# Patient Record
Sex: MALE | Race: WHITE | NOT HISPANIC OR LATINO | URBAN - NONMETROPOLITAN AREA
[De-identification: names, ages, dates, MRNs, and addresses within clinical notes are randomized per-mention and may not be internally consistent; named-entity substitution may affect disease eponyms.]

---

## 2023-06-28 ENCOUNTER — HOSPITAL ENCOUNTER (EMERGENCY)
Facility: HOSPITAL | Age: 38
Discharge: HOME | End: 2023-06-28
Attending: FAMILY MEDICINE

## 2023-06-28 ENCOUNTER — HOSPITAL ENCOUNTER (OUTPATIENT)
Facility: HOSPITAL | Age: 38
Discharge: HOME | End: 2023-06-30
Admitting: FAMILY MEDICINE

## 2023-06-28 VITALS
DIASTOLIC BLOOD PRESSURE: 97 MMHG | HEART RATE: 74 BPM | TEMPERATURE: 99 F | OXYGEN SATURATION: 98 % | HEIGHT: 66 IN | RESPIRATION RATE: 18 BRPM | SYSTOLIC BLOOD PRESSURE: 139 MMHG | BODY MASS INDEX: 24.11 KG/M2 | WEIGHT: 150 LBS

## 2023-06-28 DIAGNOSIS — R10.11 RIGHT UPPER QUADRANT ABDOMINAL PAIN: Primary | ICD-10-CM

## 2023-06-28 DIAGNOSIS — D73.4 SPLENIC CYST: ICD-10-CM

## 2023-06-28 DIAGNOSIS — K35.80 ACUTE APPENDICITIS, UNSPECIFIED ACUTE APPENDICITIS TYPE: ICD-10-CM

## 2023-06-28 DIAGNOSIS — K35.30 ACUTE APPENDICITIS WITH LOCALIZED PERITONITIS, WITHOUT PERFORATION, ABSCESS, OR GANGRENE: Primary | ICD-10-CM

## 2023-06-28 LAB
ALBUMIN SERPL-MCNC: 4.6 G/DL (ref 3.4–5)
ALBUMIN SERPL-MCNC: 4.7 G/DL (ref 3.4–5)
ALBUMIN/GLOB SERPL: 1.6 RATIO
ALBUMIN/GLOB SERPL: 1.6 RATIO
ALP SERPL-CCNC: 73 UNIT/L (ref 50–144)
ALP SERPL-CCNC: 81 UNIT/L (ref 50–144)
ALT SERPL-CCNC: 33 UNIT/L (ref 1–45)
ALT SERPL-CCNC: 34 UNIT/L (ref 1–45)
ANION GAP SERPL CALC-SCNC: 7 MEQ/L (ref 2–13)
ANION GAP SERPL CALC-SCNC: 8 MEQ/L (ref 2–13)
APPEARANCE UR: CLEAR
APPEARANCE UR: CLEAR
AST SERPL-CCNC: 27 UNIT/L (ref 17–59)
AST SERPL-CCNC: 29 UNIT/L (ref 17–59)
BACTERIA #/AREA URNS AUTO: ABNORMAL /HPF
BASOPHILS # BLD AUTO: 0.03 X10(3)/MCL (ref 0.01–0.08)
BASOPHILS # BLD AUTO: 0.04 X10(3)/MCL (ref 0.01–0.08)
BASOPHILS NFR BLD AUTO: 0.2 % (ref 0.1–1.2)
BASOPHILS NFR BLD AUTO: 0.3 % (ref 0.1–1.2)
BILIRUB UR QL STRIP.AUTO: NEGATIVE MG/DL
BILIRUB UR QL STRIP.AUTO: NEGATIVE MG/DL
BILIRUBIN DIRECT+TOT PNL SERPL-MCNC: 0.5 MG/DL (ref 0–1)
BILIRUBIN DIRECT+TOT PNL SERPL-MCNC: 0.7 MG/DL (ref 0–1)
BUN SERPL-MCNC: 10 MG/DL (ref 7–20)
BUN SERPL-MCNC: 11 MG/DL (ref 7–20)
CALCIUM SERPL-MCNC: 9 MG/DL (ref 8.4–10.2)
CALCIUM SERPL-MCNC: 9.2 MG/DL (ref 8.4–10.2)
CHLORIDE SERPL-SCNC: 104 MMOL/L (ref 98–110)
CHLORIDE SERPL-SCNC: 106 MMOL/L (ref 98–110)
CO2 SERPL-SCNC: 22 MMOL/L (ref 21–32)
CO2 SERPL-SCNC: 28 MMOL/L (ref 21–32)
COLOR UR: YELLOW
COLOR UR: YELLOW
CREAT SERPL-MCNC: 0.67 MG/DL (ref 0.66–1.25)
CREAT SERPL-MCNC: 0.77 MG/DL (ref 0.66–1.25)
CREAT/UREA NIT SERPL: 14 (ref 12–20)
CREAT/UREA NIT SERPL: 15 (ref 12–20)
EOSINOPHIL # BLD AUTO: 0.02 X10(3)/MCL (ref 0.04–0.54)
EOSINOPHIL # BLD AUTO: 0.02 X10(3)/MCL (ref 0.04–0.54)
EOSINOPHIL NFR BLD AUTO: 0.1 % (ref 0.7–7)
EOSINOPHIL NFR BLD AUTO: 0.1 % (ref 0.7–7)
ERYTHROCYTE [DISTWIDTH] IN BLOOD BY AUTOMATED COUNT: 12.7 %
ERYTHROCYTE [DISTWIDTH] IN BLOOD BY AUTOMATED COUNT: 12.8 %
GFR SERPLBLD CREATININE-BSD FMLA CKD-EPI: >90 MLS/MIN/1.73/M2
GFR SERPLBLD CREATININE-BSD FMLA CKD-EPI: >90 MLS/MIN/1.73/M2
GLOBULIN SER-MCNC: 2.9 GM/DL (ref 2–3.9)
GLOBULIN SER-MCNC: 2.9 GM/DL (ref 2–3.9)
GLUCOSE SERPL-MCNC: 103 MG/DL (ref 70–115)
GLUCOSE SERPL-MCNC: 124 MG/DL (ref 70–115)
GLUCOSE UR QL STRIP.AUTO: NEGATIVE MG/DL
GLUCOSE UR QL STRIP.AUTO: NEGATIVE MG/DL
HCT VFR BLD AUTO: 47.9 % (ref 36–52)
HCT VFR BLD AUTO: 49.6 % (ref 36–52)
HGB BLD-MCNC: 16.7 G/DL (ref 13–18)
HGB BLD-MCNC: 17.1 G/DL (ref 13–18)
IMM GRANULOCYTES # BLD AUTO: 0.03 X10(3)/MCL (ref 0–0.03)
IMM GRANULOCYTES # BLD AUTO: 0.05 X10(3)/MCL (ref 0–0.03)
IMM GRANULOCYTES NFR BLD AUTO: 0.2 % (ref 0–0.5)
IMM GRANULOCYTES NFR BLD AUTO: 0.3 % (ref 0–0.5)
KETONES UR QL STRIP.AUTO: NEGATIVE MG/DL
KETONES UR QL STRIP.AUTO: NEGATIVE MG/DL
LEUKOCYTE ESTERASE UR QL STRIP.AUTO: NEGATIVE UNIT/L
LEUKOCYTE ESTERASE UR QL STRIP.AUTO: NEGATIVE UNIT/L
LIPASE SERPL-CCNC: 31 U/L (ref 23–300)
LYMPHOCYTES # BLD AUTO: 1.53 X10(3)/MCL (ref 1.32–3.57)
LYMPHOCYTES # BLD AUTO: 1.76 X10(3)/MCL (ref 1.32–3.57)
LYMPHOCYTES NFR BLD AUTO: 10.5 % (ref 20–55)
LYMPHOCYTES NFR BLD AUTO: 12.8 % (ref 20–55)
MCH RBC QN AUTO: 30.2 PG (ref 27–34)
MCH RBC QN AUTO: 30.3 PG (ref 27–34)
MCHC RBC AUTO-ENTMCNC: 34.5 G/DL (ref 31–37)
MCHC RBC AUTO-ENTMCNC: 34.9 G/DL (ref 31–37)
MCV RBC AUTO: 86.8 FL (ref 79–99)
MCV RBC AUTO: 87.5 FL (ref 79–99)
MONOCYTES # BLD AUTO: 0.72 X10(3)/MCL (ref 0.3–0.82)
MONOCYTES # BLD AUTO: 1.11 X10(3)/MCL (ref 0.3–0.82)
MONOCYTES NFR BLD AUTO: 5.2 % (ref 4.7–12.5)
MONOCYTES NFR BLD AUTO: 7.6 % (ref 4.7–12.5)
NEUTROPHILS # BLD AUTO: 11.16 X10(3)/MCL (ref 1.78–5.38)
NEUTROPHILS # BLD AUTO: 11.84 X10(3)/MCL (ref 1.78–5.38)
NEUTROPHILS NFR BLD AUTO: 81.3 % (ref 37–73)
NEUTROPHILS NFR BLD AUTO: 81.4 % (ref 37–73)
NITRITE UR QL STRIP.AUTO: NEGATIVE
NITRITE UR QL STRIP.AUTO: NEGATIVE
NRBC BLD AUTO-RTO: 0 %
NRBC BLD AUTO-RTO: 0 %
PH UR STRIP.AUTO: 6.5 [PH]
PH UR STRIP.AUTO: 7 [PH]
PLATELET # BLD AUTO: 231 X10(3)/MCL (ref 140–371)
PLATELET # BLD AUTO: 236 X10(3)/MCL (ref 140–371)
PMV BLD AUTO: 9.1 FL (ref 9.4–12.4)
PMV BLD AUTO: 9.2 FL (ref 9.4–12.4)
POTASSIUM SERPL-SCNC: 3.6 MMOL/L (ref 3.5–5.1)
POTASSIUM SERPL-SCNC: 4.2 MMOL/L (ref 3.5–5.1)
PROT SERPL-MCNC: 7.5 GM/DL (ref 6.3–8.2)
PROT SERPL-MCNC: 7.6 GM/DL (ref 6.3–8.2)
PROT UR QL STRIP.AUTO: NEGATIVE MG/DL
PROT UR QL STRIP.AUTO: NEGATIVE MG/DL
RBC # BLD AUTO: 5.52 X10(6)/MCL (ref 4–6)
RBC # BLD AUTO: 5.67 X10(6)/MCL (ref 4–6)
RBC #/AREA URNS AUTO: ABNORMAL /HPF
RBC UR QL AUTO: ABNORMAL UNIT/L
RBC UR QL AUTO: NEGATIVE UNIT/L
SODIUM SERPL-SCNC: 136 MMOL/L (ref 135–145)
SODIUM SERPL-SCNC: 139 MMOL/L (ref 135–145)
SP GR UR STRIP.AUTO: 1.02
SP GR UR STRIP.AUTO: <=1.005
SQUAMOUS #/AREA URNS AUTO: ABNORMAL /HPF
UROBILINOGEN UR STRIP-ACNC: 0.2 MG/DL
UROBILINOGEN UR STRIP-ACNC: 0.2 MG/DL
WBC # SPEC AUTO: 13.73 X10(3)/MCL (ref 4–11.5)
WBC # SPEC AUTO: 14.58 X10(3)/MCL (ref 4–11.5)
WBC #/AREA URNS AUTO: ABNORMAL /HPF

## 2023-06-28 PROCEDURE — 25000003 PHARM REV CODE 250

## 2023-06-28 PROCEDURE — 99285 EMERGENCY DEPT VISIT HI MDM: CPT | Mod: 25

## 2023-06-28 PROCEDURE — S0030 INJECTION, METRONIDAZOLE: HCPCS

## 2023-06-28 PROCEDURE — 85025 COMPLETE CBC W/AUTO DIFF WBC: CPT

## 2023-06-28 PROCEDURE — 63600175 PHARM REV CODE 636 W HCPCS

## 2023-06-28 PROCEDURE — G0378 HOSPITAL OBSERVATION PER HR: HCPCS

## 2023-06-28 PROCEDURE — 85025 COMPLETE CBC W/AUTO DIFF WBC: CPT | Performed by: FAMILY MEDICINE

## 2023-06-28 PROCEDURE — 96375 TX/PRO/DX INJ NEW DRUG ADDON: CPT

## 2023-06-28 PROCEDURE — 96372 THER/PROPH/DIAG INJ SC/IM: CPT | Mod: 59 | Performed by: FAMILY MEDICINE

## 2023-06-28 PROCEDURE — 96367 TX/PROPH/DG ADDL SEQ IV INF: CPT

## 2023-06-28 PROCEDURE — 63600175 PHARM REV CODE 636 W HCPCS: Performed by: FAMILY MEDICINE

## 2023-06-28 PROCEDURE — 99285 EMERGENCY DEPT VISIT HI MDM: CPT | Mod: 25,27

## 2023-06-28 PROCEDURE — 36415 COLL VENOUS BLD VENIPUNCTURE: CPT

## 2023-06-28 PROCEDURE — 81001 URINALYSIS AUTO W/SCOPE: CPT | Performed by: FAMILY MEDICINE

## 2023-06-28 PROCEDURE — 80053 COMPREHEN METABOLIC PANEL: CPT

## 2023-06-28 PROCEDURE — 25500020 PHARM REV CODE 255

## 2023-06-28 PROCEDURE — 80053 COMPREHEN METABOLIC PANEL: CPT | Performed by: FAMILY MEDICINE

## 2023-06-28 PROCEDURE — 83690 ASSAY OF LIPASE: CPT

## 2023-06-28 PROCEDURE — 96365 THER/PROPH/DIAG IV INF INIT: CPT

## 2023-06-28 PROCEDURE — 36415 COLL VENOUS BLD VENIPUNCTURE: CPT | Performed by: FAMILY MEDICINE

## 2023-06-28 PROCEDURE — 81003 URINALYSIS AUTO W/O SCOPE: CPT

## 2023-06-28 RX ORDER — ONDANSETRON 2 MG/ML
4 INJECTION INTRAMUSCULAR; INTRAVENOUS EVERY 8 HOURS PRN
Status: DISCONTINUED | OUTPATIENT
Start: 2023-06-28 | End: 2023-06-28

## 2023-06-28 RX ORDER — SODIUM CHLORIDE 0.9 % (FLUSH) 0.9 %
10 SYRINGE (ML) INJECTION
Status: DISCONTINUED | OUTPATIENT
Start: 2023-06-28 | End: 2023-06-30 | Stop reason: HOSPADM

## 2023-06-28 RX ORDER — SODIUM CHLORIDE 450 MG/100ML
INJECTION, SOLUTION INTRAVENOUS CONTINUOUS
Status: DISCONTINUED | OUTPATIENT
Start: 2023-06-29 | End: 2023-06-30 | Stop reason: HOSPADM

## 2023-06-28 RX ORDER — LORAZEPAM 2 MG/ML
0.5 INJECTION INTRAMUSCULAR
Status: COMPLETED | OUTPATIENT
Start: 2023-06-28 | End: 2023-06-28

## 2023-06-28 RX ORDER — SUCRALFATE 1 G/10ML
1 SUSPENSION ORAL EVERY 6 HOURS
Status: DISCONTINUED | OUTPATIENT
Start: 2023-06-28 | End: 2023-06-30 | Stop reason: HOSPADM

## 2023-06-28 RX ORDER — FAMOTIDINE 10 MG/ML
20 INJECTION INTRAVENOUS EVERY 12 HOURS
Status: DISCONTINUED | OUTPATIENT
Start: 2023-06-29 | End: 2023-06-30 | Stop reason: HOSPADM

## 2023-06-28 RX ORDER — HYDRALAZINE HYDROCHLORIDE 20 MG/ML
10 INJECTION INTRAMUSCULAR; INTRAVENOUS EVERY 6 HOURS PRN
Status: DISCONTINUED | OUTPATIENT
Start: 2023-06-29 | End: 2023-06-30 | Stop reason: HOSPADM

## 2023-06-28 RX ORDER — TALC
6 POWDER (GRAM) TOPICAL NIGHTLY PRN
Status: DISCONTINUED | OUTPATIENT
Start: 2023-06-28 | End: 2023-06-30 | Stop reason: HOSPADM

## 2023-06-28 RX ORDER — HYDROMORPHONE HYDROCHLORIDE 1 MG/ML
0.5 INJECTION, SOLUTION INTRAMUSCULAR; INTRAVENOUS; SUBCUTANEOUS
Status: COMPLETED | OUTPATIENT
Start: 2023-06-28 | End: 2023-06-28

## 2023-06-28 RX ORDER — HYDROMORPHONE HYDROCHLORIDE 1 MG/ML
1 INJECTION, SOLUTION INTRAMUSCULAR; INTRAVENOUS; SUBCUTANEOUS EVERY 4 HOURS PRN
Status: DISCONTINUED | OUTPATIENT
Start: 2023-06-28 | End: 2023-06-30 | Stop reason: HOSPADM

## 2023-06-28 RX ORDER — PANTOPRAZOLE SODIUM 40 MG/10ML
40 INJECTION, POWDER, LYOPHILIZED, FOR SOLUTION INTRAVENOUS DAILY
Status: DISCONTINUED | OUTPATIENT
Start: 2023-06-29 | End: 2023-06-30 | Stop reason: HOSPADM

## 2023-06-28 RX ORDER — METRONIDAZOLE 500 MG/100ML
500 INJECTION, SOLUTION INTRAVENOUS
Status: DISCONTINUED | OUTPATIENT
Start: 2023-06-28 | End: 2023-06-30 | Stop reason: HOSPADM

## 2023-06-28 RX ORDER — DEXTROSE MONOHYDRATE AND SODIUM CHLORIDE 5; .9 G/100ML; G/100ML
INJECTION, SOLUTION INTRAVENOUS CONTINUOUS
Status: DISCONTINUED | OUTPATIENT
Start: 2023-06-29 | End: 2023-06-28

## 2023-06-28 RX ORDER — ONDANSETRON 2 MG/ML
4 INJECTION INTRAMUSCULAR; INTRAVENOUS EVERY 6 HOURS PRN
Status: DISCONTINUED | OUTPATIENT
Start: 2023-06-29 | End: 2023-06-30 | Stop reason: HOSPADM

## 2023-06-28 RX ORDER — HYDROMORPHONE HYDROCHLORIDE 1 MG/ML
0.5 INJECTION, SOLUTION INTRAMUSCULAR; INTRAVENOUS; SUBCUTANEOUS EVERY 4 HOURS PRN
Status: DISCONTINUED | OUTPATIENT
Start: 2023-06-28 | End: 2023-06-28

## 2023-06-28 RX ORDER — DICYCLOMINE HYDROCHLORIDE 20 MG/1
20 TABLET ORAL
Qty: 15 TABLET | Refills: 0 | Status: SHIPPED | OUTPATIENT
Start: 2023-06-28 | End: 2023-07-26 | Stop reason: ALTCHOICE

## 2023-06-28 RX ORDER — PROCHLORPERAZINE EDISYLATE 5 MG/ML
5 INJECTION INTRAMUSCULAR; INTRAVENOUS EVERY 6 HOURS PRN
Status: DISCONTINUED | OUTPATIENT
Start: 2023-06-28 | End: 2023-06-30 | Stop reason: HOSPADM

## 2023-06-28 RX ORDER — KETOROLAC TROMETHAMINE 30 MG/ML
30 INJECTION, SOLUTION INTRAMUSCULAR; INTRAVENOUS
Status: DISCONTINUED | OUTPATIENT
Start: 2023-06-28 | End: 2023-06-28

## 2023-06-28 RX ORDER — KETOROLAC TROMETHAMINE 30 MG/ML
60 INJECTION, SOLUTION INTRAMUSCULAR; INTRAVENOUS
Status: COMPLETED | OUTPATIENT
Start: 2023-06-28 | End: 2023-06-28

## 2023-06-28 RX ORDER — METRONIDAZOLE 500 MG/100ML
500 INJECTION, SOLUTION INTRAVENOUS
Status: DISCONTINUED | OUTPATIENT
Start: 2023-06-28 | End: 2023-06-28

## 2023-06-28 RX ORDER — MORPHINE SULFATE 2 MG/ML
2 INJECTION, SOLUTION INTRAMUSCULAR; INTRAVENOUS EVERY 6 HOURS PRN
Status: DISCONTINUED | OUTPATIENT
Start: 2023-06-29 | End: 2023-06-30 | Stop reason: HOSPADM

## 2023-06-28 RX ADMIN — HYDROMORPHONE HYDROCHLORIDE 0.5 MG: 1 INJECTION, SOLUTION INTRAMUSCULAR; INTRAVENOUS; SUBCUTANEOUS at 09:06

## 2023-06-28 RX ADMIN — IOPAMIDOL 100 ML: 755 INJECTION, SOLUTION INTRAVENOUS at 09:06

## 2023-06-28 RX ADMIN — KETOROLAC TROMETHAMINE 60 MG: 30 INJECTION, SOLUTION INTRAMUSCULAR; INTRAVENOUS at 12:06

## 2023-06-28 RX ADMIN — METRONIDAZOLE 500 MG: 500 INJECTION, SOLUTION INTRAVENOUS at 11:06

## 2023-06-28 RX ADMIN — LORAZEPAM 0.5 MG: 2 INJECTION INTRAMUSCULAR; INTRAVENOUS at 09:06

## 2023-06-28 RX ADMIN — SODIUM CHLORIDE 1000 ML: 9 INJECTION, SOLUTION INTRAVENOUS at 09:06

## 2023-06-28 RX ADMIN — PIPERACILLIN AND TAZOBACTAM 4.5 G: 4; .5 INJECTION, POWDER, FOR SOLUTION INTRAVENOUS; PARENTERAL at 09:06

## 2023-06-28 NOTE — ED NOTES
Pt to Ed with C/o RUQ abd pain, pt tender on palpation, last BM, yesterday, reported normal. Denies fevers. Pt aaox4, gcs 15

## 2023-06-28 NOTE — DISCHARGE INSTRUCTIONS
To good idea to follow up with a surgeon to re-evaluate the cyst.  Dr. Mclean's number is provided.  If you develop severe pain weakness high fevers etc. feel free to return to the emergency room for further evaluation.

## 2023-06-28 NOTE — ED PROVIDER NOTES
Encounter Date: 6/28/2023       History     Chief Complaint   Patient presents with    Abdominal Pain     Pt to Ed with c/o URQ abd pain since this am. Worse with movement. Denies fever, Last normal BM yesterday. Last Oral intake 0730a.      Patient presents to the emergency room with a history of right upper quadrant pain that started this morning.  He describes it as pretty severe, no nausea or vomiting, no fever.  It was never happened before it was not worse with movement or eating.    The history is provided by the patient.   Review of patient's allergies indicates:  No Known Allergies  History reviewed. No pertinent past medical history.  History reviewed. No pertinent surgical history.  History reviewed. No pertinent family history.  Social History     Tobacco Use    Smoking status: Every Day     Packs/day: 1.00     Years: 18.00     Pack years: 18.00     Types: Cigarettes    Smokeless tobacco: Never   Substance Use Topics    Drug use: Not Currently     Types: Marijuana     Review of Systems   Constitutional:  Negative for fever.   HENT:  Negative for sore throat.    Respiratory:  Negative for shortness of breath.    Cardiovascular:  Negative for chest pain.   Gastrointestinal:  Positive for abdominal pain. Negative for constipation, diarrhea, nausea and vomiting.   Genitourinary:  Negative for dysuria.   Musculoskeletal:  Negative for back pain.   Skin:  Negative for rash.   Neurological:  Negative for weakness.   Hematological:  Does not bruise/bleed easily.   All other systems reviewed and are negative.    Physical Exam     Initial Vitals [06/28/23 1143]   BP Pulse Resp Temp SpO2   (!) 163/101 74 18 98.9 °F (37.2 °C) 98 %      MAP       --         Physical Exam    Nursing note and vitals reviewed.  Constitutional: Vital signs are normal. He appears well-developed and well-nourished. He is cooperative.  Non-toxic appearance. He does not appear ill.   Appears uncomfortable, but is in no distress. A+Ox4.     HENT:   Head: Normocephalic and atraumatic.   Eyes: Conjunctivae and lids are normal.   Neck: Trachea normal. Neck supple.   Cardiovascular:  Normal rate and regular rhythm.  No extrasystoles are present.          Pulmonary/Chest: Breath sounds normal.   Abdominal: Abdomen is soft. There is abdominal tenderness.   Moderate point tenderness right middle quadrant. No r/g, nabs   Musculoskeletal:         General: Normal range of motion.      Cervical back: Neck supple.     Neurological: He is alert and oriented to person, place, and time. He has normal strength. No cranial nerve deficit or sensory deficit. He displays a negative Romberg sign.   Skin: Skin is warm, dry and intact. Capillary refill takes less than 2 seconds.   Psychiatric: He has a normal mood and affect. His speech is normal and behavior is normal. He is not actively hallucinating. He is attentive.       ED Course   Procedures  Labs Reviewed   CBC WITH DIFFERENTIAL - Abnormal; Notable for the following components:       Result Value    WBC 13.73 (*)     MPV 9.1 (*)     Neut % 81.4 (*)     Lymph % 12.8 (*)     Eos % 0.1 (*)     Neut # 11.16 (*)     Eos # 0.02 (*)     All other components within normal limits   CBC W/ AUTO DIFFERENTIAL    Narrative:     The following orders were created for panel order CBC Auto Differential.  Procedure                               Abnormality         Status                     ---------                               -----------         ------                     CBC with Differential[[id number]]        Abnormal            Final result                 Please view results for these tests on the individual orders.   COMPREHENSIVE METABOLIC PANEL   URINALYSIS    Narrative:      URINE STABILITY IS 2 HOURS AT ROOM TEMP OR    SIX HOURS REFRIGERATED. PERFORMING TESTING ON    SPECIMENS GREATER THAN THIS AGE MAY AFFECT THE    FOLLOWING TESTS:    PH          SPECIFIC GRAVITY           BLOOD    CLARITY     BILIRUBIN                UROBILINOGEN          Imaging Results              CT Abdomen Pelvis  Without Contrast (Final result)  Result time 06/28/23 12:56:19      Final result by Yg Bowen III, MD (06/28/23 12:56:19)                   Impression:      1. A large (at least 10-11 cm in greatest diameter) cyst with thin peripheral calcification is noted within the spleen.  As described above.  See above comments.  2. Low-grade chronic changes are present      Electronically signed by: Yg Bowen  Date:    06/28/2023  Time:    12:56               Narrative:    EXAMINATION:  CT ABDOMEN PELVIS WITHOUT CONTRAST    CLINICAL HISTORY AND TECHNIQUE:  Aylin Curry, RT on 6/28/2023 12:37 PM    PT STATUS: ER    PROCEDURE: CT ABD/PELVIS WO    CLINICAL HX : RLQ ABD. PAIN ONSET THIS AM    PMH: N/A    IV CONTRAST: NONE    ORAL CONTRAST: NONE    RECTAL CONTRAST: NONE    AXIAL IMAGES @ 5MM INTERVALS WITH MULTIPLANAR RECONSTRUCTION    TOTAL IMAGE NUMBER: 158    NUMBER OF CT SCANS IN PAST 12 MONTHS: 1    CTDIvol(mGy): HEAD:     BODY: 5.3    DLP(mGycm): HEAD:     BODY: 309.2    TECH: AKG/KDF    PT TRANSPORTED W/O INCIDENT    This patient has had 1 CT and nuclear medicine scans performed within the last 12 months.    The following DOSE REDUCTION TECHNIQUES are used for all CT scans at [facility]:    1. Automated exposure control.  2. Adjustment of the mA and/or kv according to patient size.  3. Use of iterative reconstruction technique.    COMPARISON:  None    FINDINGS:  Liver: No clinically significant abnormalities are noted.    Gallbladder/biliary system: No clinically significant abnormalities are noted.    Spleen: A large (at least 10-11 cm in greatest diameter) cyst with thin peripheral calcification is noted within the spleen.    Adrenal glands: No clinically significant abnormalities are noted.    Pancreas: No clinically significant abnormalities are noted.    Kidneys/ureters: No clinically significant abnormalities are  noted.    Urinary bladder: The urinary bladder is poorly distended with no gross abnormalities noted on limited imaging.    Prostate gland and seminal vesicles: No clinically significant abnormalities are noted.    GI tract: Unopacified loops of large and small bowel as well as the gastric lumen and appendix are difficult to evaluate with no definite abnormalities appreciated.    Vascular structures: Minimal atherosclerotic plaquing is noted within the abdominal aorta.    Musculoskeletal structures: A mild-to-moderate, levoconvex, scoliotic curvature of the lumbar spine is present with minimal degenerative changes noted involving the lumbar spine.    Miscellaneous: N/A                                    X-Rays:   Independently Interpreted Readings:   Abdomen: Large splenic cyst 10cm   Medications   ketorolac injection 60 mg (60 mg Intramuscular Given 6/28/23 1227)     Medical Decision Making:   Initial Assessment:   Abdominal pain  Differential Diagnosis:   UTI, kidney stone, GB, appendix  Clinical Tests:   Lab Tests: Ordered and Reviewed  The following lab test(s) were unremarkable: CBC, CMP and Urinalysis  Radiological Study: Ordered and Reviewed  ED Management:  All labs are normal, CT shows no abnormalities in the right side of the abdomen but did show a large about a 10 cm splenic cyst with calcifications.  Doubt that the cyst is causing pain being that it is on the opposite side of the abdomen.  We will have him follow-up with a surgeon, family with him wants to take him to see Dr. Mclean we will provide the phone number.                        Clinical Impression:   Final diagnoses:  [R10.11] Right upper quadrant abdominal pain (Primary)  [D73.4] Splenic cyst        ED Disposition Condition    Discharge Stable          ED Prescriptions       Medication Sig Dispense Start Date End Date Auth. Provider    dicyclomine (BENTYL) 20 mg tablet Take 1 tablet (20 mg total) by mouth every 6 to 8 hours as needed  (abdominal pain). 15 tablet 6/28/2023 -- Quinton Nix MD          Follow-up Information       Follow up With Specialties Details Why Contact Info    Matthew Mclean MD General Surgery, Cardiology Schedule an appointment as soon as possible for a visit   [id number]  [facility] [address]  [place] [id number]  [phone]               Quinton Nix MD  06/28/23 1903

## 2023-06-29 ENCOUNTER — ANESTHESIA EVENT (OUTPATIENT)
Dept: SURGERY | Facility: HOSPITAL | Age: 38
End: 2023-06-29

## 2023-06-29 ENCOUNTER — ANESTHESIA (OUTPATIENT)
Dept: SURGERY | Facility: HOSPITAL | Age: 38
End: 2023-06-29

## 2023-06-29 PROBLEM — K35.32 ACUTE APPENDICITIS WITH PERFORATION, LOCALIZED PERITONITIS, AND GANGRENE, WITHOUT ABSCESS: Status: ACTIVE | Noted: 2023-06-29

## 2023-06-29 LAB
ANION GAP SERPL CALC-SCNC: 6 MEQ/L (ref 2–13)
BASOPHILS # BLD AUTO: 0.03 X10(3)/MCL (ref 0.01–0.08)
BASOPHILS NFR BLD AUTO: 0.2 % (ref 0.1–1.2)
BUN SERPL-MCNC: 9 MG/DL (ref 7–20)
CALCIUM SERPL-MCNC: 8.7 MG/DL (ref 8.4–10.2)
CHLORIDE SERPL-SCNC: 106 MMOL/L (ref 98–110)
CO2 SERPL-SCNC: 26 MMOL/L (ref 21–32)
CREAT SERPL-MCNC: 0.72 MG/DL (ref 0.66–1.25)
CREAT/UREA NIT SERPL: 13 (ref 12–20)
EOSINOPHIL # BLD AUTO: 0.03 X10(3)/MCL (ref 0.04–0.54)
EOSINOPHIL NFR BLD AUTO: 0.2 % (ref 0.7–7)
ERYTHROCYTE [DISTWIDTH] IN BLOOD BY AUTOMATED COUNT: 13 %
GFR SERPLBLD CREATININE-BSD FMLA CKD-EPI: >90 MLS/MIN/1.73/M2
GLUCOSE SERPL-MCNC: 91 MG/DL (ref 70–115)
HCT VFR BLD AUTO: 46.1 % (ref 36–52)
HGB BLD-MCNC: 15.7 G/DL (ref 13–18)
IMM GRANULOCYTES # BLD AUTO: 0.05 X10(3)/MCL (ref 0–0.03)
IMM GRANULOCYTES NFR BLD AUTO: 0.4 % (ref 0–0.5)
LYMPHOCYTES # BLD AUTO: 2.53 X10(3)/MCL (ref 1.32–3.57)
LYMPHOCYTES NFR BLD AUTO: 17.9 % (ref 20–55)
MCH RBC QN AUTO: 30 PG (ref 27–34)
MCHC RBC AUTO-ENTMCNC: 34.1 G/DL (ref 31–37)
MCV RBC AUTO: 88 FL (ref 79–99)
MONOCYTES # BLD AUTO: 1.06 X10(3)/MCL (ref 0.3–0.82)
MONOCYTES NFR BLD AUTO: 7.5 % (ref 4.7–12.5)
NEUTROPHILS # BLD AUTO: 10.47 X10(3)/MCL (ref 1.78–5.38)
NEUTROPHILS NFR BLD AUTO: 73.8 % (ref 37–73)
NRBC BLD AUTO-RTO: 0 %
PLATELET # BLD AUTO: 211 X10(3)/MCL (ref 140–371)
PMV BLD AUTO: 9.5 FL (ref 9.4–12.4)
POTASSIUM SERPL-SCNC: 4.3 MMOL/L (ref 3.5–5.1)
RBC # BLD AUTO: 5.24 X10(6)/MCL (ref 4–6)
SODIUM SERPL-SCNC: 138 MMOL/L (ref 135–145)
WBC # SPEC AUTO: 14.17 X10(3)/MCL (ref 4–11.5)

## 2023-06-29 PROCEDURE — 63600175 PHARM REV CODE 636 W HCPCS

## 2023-06-29 PROCEDURE — 36000709 HC OR TIME LEV III EA ADD 15 MIN: Performed by: SURGERY

## 2023-06-29 PROCEDURE — 63600175 PHARM REV CODE 636 W HCPCS: Performed by: INTERNAL MEDICINE

## 2023-06-29 PROCEDURE — 63600175 PHARM REV CODE 636 W HCPCS: Performed by: SURGERY

## 2023-06-29 PROCEDURE — 25000003 PHARM REV CODE 250: Performed by: INTERNAL MEDICINE

## 2023-06-29 PROCEDURE — 63600175 PHARM REV CODE 636 W HCPCS: Performed by: NURSE ANESTHETIST, CERTIFIED REGISTERED

## 2023-06-29 PROCEDURE — D9220A PRA ANESTHESIA: Mod: ,,, | Performed by: NURSE ANESTHETIST, CERTIFIED REGISTERED

## 2023-06-29 PROCEDURE — 96366 THER/PROPH/DIAG IV INF ADDON: CPT

## 2023-06-29 PROCEDURE — 96368 THER/DIAG CONCURRENT INF: CPT

## 2023-06-29 PROCEDURE — 96375 TX/PRO/DX INJ NEW DRUG ADDON: CPT

## 2023-06-29 PROCEDURE — 25000003 PHARM REV CODE 250: Performed by: NURSE ANESTHETIST, CERTIFIED REGISTERED

## 2023-06-29 PROCEDURE — G0378 HOSPITAL OBSERVATION PER HR: HCPCS

## 2023-06-29 PROCEDURE — D9220A PRA ANESTHESIA: ICD-10-PCS | Mod: ,,, | Performed by: NURSE ANESTHETIST, CERTIFIED REGISTERED

## 2023-06-29 PROCEDURE — 25000003 PHARM REV CODE 250: Performed by: SURGERY

## 2023-06-29 PROCEDURE — 36415 COLL VENOUS BLD VENIPUNCTURE: CPT

## 2023-06-29 PROCEDURE — S0030 INJECTION, METRONIDAZOLE: HCPCS | Performed by: SURGERY

## 2023-06-29 PROCEDURE — S0030 INJECTION, METRONIDAZOLE: HCPCS

## 2023-06-29 PROCEDURE — C9113 INJ PANTOPRAZOLE SODIUM, VIA: HCPCS | Performed by: INTERNAL MEDICINE

## 2023-06-29 PROCEDURE — 80048 BASIC METABOLIC PNL TOTAL CA: CPT

## 2023-06-29 PROCEDURE — 96376 TX/PRO/DX INJ SAME DRUG ADON: CPT

## 2023-06-29 PROCEDURE — 36000708 HC OR TIME LEV III 1ST 15 MIN: Performed by: SURGERY

## 2023-06-29 PROCEDURE — 37000009 HC ANESTHESIA EA ADD 15 MINS: Performed by: SURGERY

## 2023-06-29 PROCEDURE — 37000008 HC ANESTHESIA 1ST 15 MINUTES: Performed by: SURGERY

## 2023-06-29 PROCEDURE — 71000033 HC RECOVERY, INTIAL HOUR: Performed by: SURGERY

## 2023-06-29 PROCEDURE — 96361 HYDRATE IV INFUSION ADD-ON: CPT

## 2023-06-29 PROCEDURE — 27201423 OPTIME MED/SURG SUP & DEVICES STERILE SUPPLY: Performed by: SURGERY

## 2023-06-29 PROCEDURE — 25000003 PHARM REV CODE 250

## 2023-06-29 PROCEDURE — 25000003 PHARM REV CODE 250: Performed by: FAMILY MEDICINE

## 2023-06-29 PROCEDURE — 94761 N-INVAS EAR/PLS OXIMETRY MLT: CPT

## 2023-06-29 PROCEDURE — 85025 COMPLETE CBC W/AUTO DIFF WBC: CPT

## 2023-06-29 RX ORDER — PROPOFOL 10 MG/ML
VIAL (ML) INTRAVENOUS
Status: DISCONTINUED | OUTPATIENT
Start: 2023-06-29 | End: 2023-06-29

## 2023-06-29 RX ORDER — HYDROCODONE BITARTRATE AND ACETAMINOPHEN 7.5; 325 MG/1; MG/1
1 TABLET ORAL EVERY 6 HOURS PRN
Status: DISCONTINUED | OUTPATIENT
Start: 2023-06-29 | End: 2023-06-30 | Stop reason: HOSPADM

## 2023-06-29 RX ORDER — MIDAZOLAM HYDROCHLORIDE 1 MG/ML
2 INJECTION INTRAMUSCULAR; INTRAVENOUS
Status: COMPLETED | OUTPATIENT
Start: 2023-06-29 | End: 2023-06-29

## 2023-06-29 RX ORDER — DEXMEDETOMIDINE HYDROCHLORIDE 100 UG/ML
INJECTION, SOLUTION INTRAVENOUS
Status: DISCONTINUED | OUTPATIENT
Start: 2023-06-29 | End: 2023-06-29

## 2023-06-29 RX ORDER — VECURONIUM BROMIDE FOR INJECTION 1 MG/ML
INJECTION, POWDER, LYOPHILIZED, FOR SOLUTION INTRAVENOUS
Status: DISCONTINUED | OUTPATIENT
Start: 2023-06-29 | End: 2023-06-29

## 2023-06-29 RX ORDER — LIDOCAINE HYDROCHLORIDE 20 MG/ML
INJECTION INTRAVENOUS
Status: DISCONTINUED | OUTPATIENT
Start: 2023-06-29 | End: 2023-06-29

## 2023-06-29 RX ORDER — ACETAMINOPHEN 10 MG/ML
INJECTION, SOLUTION INTRAVENOUS
Status: DISCONTINUED | OUTPATIENT
Start: 2023-06-29 | End: 2023-06-29

## 2023-06-29 RX ORDER — FENTANYL CITRATE 50 UG/ML
INJECTION, SOLUTION INTRAMUSCULAR; INTRAVENOUS
Status: DISCONTINUED | OUTPATIENT
Start: 2023-06-29 | End: 2023-06-29

## 2023-06-29 RX ORDER — FAMOTIDINE 20 MG/1
20 TABLET, FILM COATED ORAL
Status: COMPLETED | OUTPATIENT
Start: 2023-06-29 | End: 2023-06-29

## 2023-06-29 RX ORDER — LIDOCAINE HYDROCHLORIDE AND EPINEPHRINE 10; 10 MG/ML; UG/ML
INJECTION, SOLUTION INFILTRATION; PERINEURAL
Status: DISCONTINUED | OUTPATIENT
Start: 2023-06-29 | End: 2023-06-29 | Stop reason: HOSPADM

## 2023-06-29 RX ADMIN — FENTANYL CITRATE 50 MCG: 50 INJECTION, SOLUTION INTRAMUSCULAR; INTRAVENOUS at 11:06

## 2023-06-29 RX ADMIN — VECURONIUM BROMIDE 7 MG: 10 INJECTION, POWDER, FOR SOLUTION INTRAVENOUS at 11:06

## 2023-06-29 RX ADMIN — GLYCOPYRROLATE 0.2 MG: 0.2 INJECTION, SOLUTION INTRAMUSCULAR; INTRAVITREAL at 10:06

## 2023-06-29 RX ADMIN — LIDOCAINE HYDROCHLORIDE 60 MG: 20 INJECTION, SOLUTION INTRAVENOUS at 11:06

## 2023-06-29 RX ADMIN — FENTANYL CITRATE 50 MCG: 50 INJECTION, SOLUTION INTRAMUSCULAR; INTRAVENOUS at 12:06

## 2023-06-29 RX ADMIN — DEXMEDETOMIDINE 10 MCG: 100 INJECTION, SOLUTION, CONCENTRATE INTRAVENOUS at 12:06

## 2023-06-29 RX ADMIN — PIPERACILLIN AND TAZOBACTAM 4.5 G: 4; .5 INJECTION, POWDER, FOR SOLUTION INTRAVENOUS; PARENTERAL at 04:06

## 2023-06-29 RX ADMIN — FAMOTIDINE 20 MG: 10 INJECTION, SOLUTION INTRAVENOUS at 12:06

## 2023-06-29 RX ADMIN — FAMOTIDINE 20 MG: 10 INJECTION, SOLUTION INTRAVENOUS at 08:06

## 2023-06-29 RX ADMIN — HYDROCODONE BITARTRATE AND ACETAMINOPHEN 1 TABLET: 7.5; 325 TABLET ORAL at 03:06

## 2023-06-29 RX ADMIN — METRONIDAZOLE 500 MG: 500 INJECTION, SOLUTION INTRAVENOUS at 11:06

## 2023-06-29 RX ADMIN — HYDROMORPHONE HYDROCHLORIDE 1 MG: 1 INJECTION, SOLUTION INTRAMUSCULAR; INTRAVENOUS; SUBCUTANEOUS at 08:06

## 2023-06-29 RX ADMIN — MIDAZOLAM HYDROCHLORIDE 2 MG: 1 INJECTION, SOLUTION INTRAMUSCULAR; INTRAVENOUS at 10:06

## 2023-06-29 RX ADMIN — SUCRALFATE ORAL 1 G: 1 SUSPENSION ORAL at 11:06

## 2023-06-29 RX ADMIN — CEFOXITIN SODIUM 2 G: 2 POWDER, FOR SOLUTION INTRAVENOUS at 11:06

## 2023-06-29 RX ADMIN — SUGAMMADEX 200 MG: 100 INJECTION, SOLUTION INTRAVENOUS at 12:06

## 2023-06-29 RX ADMIN — SUCRALFATE ORAL 1 G: 1 SUSPENSION ORAL at 05:06

## 2023-06-29 RX ADMIN — FAMOTIDINE 20 MG: 20 TABLET, FILM COATED ORAL at 10:06

## 2023-06-29 RX ADMIN — PANTOPRAZOLE SODIUM 40 MG: 40 INJECTION, POWDER, FOR SOLUTION INTRAVENOUS at 08:06

## 2023-06-29 RX ADMIN — PIPERACILLIN AND TAZOBACTAM 4.5 G: 4; .5 INJECTION, POWDER, FOR SOLUTION INTRAVENOUS; PARENTERAL at 08:06

## 2023-06-29 RX ADMIN — METRONIDAZOLE 500 MG: 500 INJECTION, SOLUTION INTRAVENOUS at 03:06

## 2023-06-29 RX ADMIN — MORPHINE SULFATE 2 MG: 2 INJECTION, SOLUTION INTRAMUSCULAR; INTRAVENOUS at 05:06

## 2023-06-29 RX ADMIN — METRONIDAZOLE 500 MG: 500 INJECTION, SOLUTION INTRAVENOUS at 07:06

## 2023-06-29 RX ADMIN — DEXMEDETOMIDINE 10 MCG: 100 INJECTION, SOLUTION, CONCENTRATE INTRAVENOUS at 11:06

## 2023-06-29 RX ADMIN — SODIUM CHLORIDE: 4.5 INJECTION, SOLUTION INTRAVENOUS at 12:06

## 2023-06-29 RX ADMIN — PIPERACILLIN AND TAZOBACTAM 4.5 G: 4; .5 INJECTION, POWDER, FOR SOLUTION INTRAVENOUS; PARENTERAL at 12:06

## 2023-06-29 RX ADMIN — SUCRALFATE ORAL 1 G: 1 SUSPENSION ORAL at 12:06

## 2023-06-29 RX ADMIN — FENTANYL CITRATE 100 MCG: 50 INJECTION, SOLUTION INTRAMUSCULAR; INTRAVENOUS at 11:06

## 2023-06-29 RX ADMIN — ACETAMINOPHEN 1000 MG: 1000 INJECTION, SOLUTION INTRAVENOUS at 11:06

## 2023-06-29 RX ADMIN — PROPOFOL 200 MG: 10 INJECTION, EMULSION INTRAVENOUS at 11:06

## 2023-06-29 RX ADMIN — VECURONIUM BROMIDE 1 MG: 10 INJECTION, POWDER, FOR SOLUTION INTRAVENOUS at 11:06

## 2023-06-29 NOTE — PROGRESS NOTES
Hospital Medicine  Progress Note    Patient Name: Anthony Ibarra  MRN: [id number]  Status: OP- Observation   Admission Date: 6/28/2023  Length of Stay: 0  Date of Service: 06/29/2023       CC: hospital follow-up for        SUBJECTIVE   37 y.o. male with a medical history of nicotine addiction presented to the ER on account of persistent right lower quadrant abdominal pain since earlier this morning.    Patient has been at his usual state of health until earlier today when he developed right lower quadrant abdominal pain, sharp, persistent, about 8/10 in intensity, radiating to involve the lower abdominal area with no aggravating or relieving factor.  Associated with nausea but no vomiting.  No diarrhea no constipation.  No fever.  He decided to come to the ER for further evaluation.  Earlier in the morning he had a CT of the abdomen without contrast which does not demonstrate any significant finding.  However, he had another CT of the abdomen with contrast this evening and the results demonstrates multiple loops of nondilated fluid-filled small bowel and at the junction of the large and small bowel several intraluminal small calcific densities are present which makes it difficult to exclude the possibility of appendicoliths and dilated fluid-filled appendix.Clinical correlation is recommended to exclude appendicitis.  General surgeon has been consulted from the ER, recommendation is to repeat CT of the abdomen in the morning.    06/29/2023   Npo for lap appendectomy    Today: Patient seen and examined at bedside, and chart reviewed.       MEDICATIONS   Scheduled   famotidine (PF)  20 mg Intravenous Q12H    metronidazole  500 mg Intravenous Q8H    pantoprazole  40 mg Intravenous Daily    piperacillin-tazobactam (Zosyn) IV (PEDS and ADULTS) (extended infusion is not appropriate)  4.5 g Intravenous Q8H    sucralfate  1 g Oral Q6H     Continuous Infusions   sodium chloride 0.45% 1,400 mL/hr at 06/29/23 1216          PHYSICAL EXAM   VITALS: T 97.9 °F (36.6 °C)   /61   P 84   RR 20   O2 98 %    GENERAL: Awake and in NAD  LUNGS: CTA B/L  CVS: Normal rate  GI/: Soft, tender to palpation ruq/rlq  EXTREMITIES: No peripheral edema  NEURO: AAOx3  PSYCH: Cooperative      LABS   CBC  Recent Labs     06/28/23 2103 06/29/23  0424   WBC 14.58* 14.17*   RBC 5.52 5.24   HGB 16.7 15.7   HCT 47.9 46.1   MCV 86.8 88.0   MCH 30.3 30.0   MCHC 34.9 34.1   RDW 12.8 13.0    211     CHEM  Recent Labs     06/28/23  1225 06/28/23 2103 06/29/23  0424    136 138   K 4.2 3.6 4.3   CHLORIDE 104 106 106   CO2 28 22 26   BUN 10.0 11.0 9.0   CREATININE 0.67 0.77 0.72   GLUCOSE 103 124* 91   CALCIUM 9.2 9.0 8.7   ALBUMIN 4.7 4.6  --    GLOBULIN 2.9 2.9  --    ALKPHOS 81 73  --    ALT 33 34  --    AST 29 27  --    BILITOT 0.5 0.7  --    LIPASE  --  31  --          MICROBIOLOGY     Microbiology Results (last 7 days)       ** No results found for the last 168 hours. **              DIAGNOSTICS   CT Abdomen Pelvis With Contrast  Narrative: STUDY: CT SCAN OF THE ABDOMEN AND PELVIS WITH INTRAVENOUS CONTRAST    CLINICAL HISTORY & TECHNIQUE:    Pablo Avendano, RT on 6/28/2023  9:35 PM    PROCEDURE: CT ABD/PEL W CONT    CLINICAL HX: ER    X 10 HOURS (THESE SYMPTOMS DEVELOPED SINCE FIRST ER VISIT EARLIER TODAY) - FEVER    AND INCREASED WBC    X APPROX 18 HOURS - RLQ ABD PAIN AND ELEVATED WBC    PMH: SPLENIC CYST SEEN ON PRIOR EXAM    IV CONTRAST: 100CC ISOVUE 370    ORAL CONTRAST: NONE    RECTAL CONTRAST: NONE    AXIAL IMAGING @ 5MM INTERVALS W/MULTIPLANAR RECONSTRUCTION OF IMAGES    TOTAL IMAGE NUMBER: 311    CTDIvol(mGy): HEAD:  BODY: 10.6    DLP(mGycm): HEAD:  BODY: 554.7    # CT'S LAST 12 MONTHS: 2    TECH: AJR    PT TRANSPORTED W/O INCIDENT    COMPARISON:  Earlier same date    PERTINENT PAST RADIOLOGIC HISTORY FOR RADIATION EXPOSURE:  2 CT scan(s) and Cardiac perfusion scan(s) on record for the last 12 months    FINDINGS:    Liver:  No  clinically significant abnormalities noted.    Gallbladder/Biliary System:  No clinically significant abnormalities noted.    Spleen:  A 10 cm splenic calcification with thin peripheral calcified rim.    Adrenal glands:  No clinically significant abnormalities noted.    Pancreas:  No clinically significant abnormalities noted.    Kidneys/Urinary Tract:  No clinically significant abnormalities noted.    Urinary bladder:  No clinically significant abnormalities noted.    Prostate gland/uterus and ovaries:  No clinically significant abnormalities noted.    GI tract:  Fluid is present in nondilated loops of small bowel and there are small calcific densities within bowel in the cecal region, the presence of which makes it difficult to exclude the possibility of appendicoliths and dilated fluid-filled appendix.  Recommend clinical correlation.    Vascular structures:  No clinically significant abnormalities noted.    Musculoskeletal structures:  No clinically significant abnormalities noted.    Miscellaneous:  No clinically significant abnormalities noted.  Impression: 1. The patient demonstrates multiple loops of nondilated fluid-filled small bowel and at the junction of the large and small bowel several intraluminal small calcific densities are present which makes it difficult to exclude the possibility of appendicoliths and dilated fluid-filled appendix.  Clinical correlation is recommended to exclude appendicitis.  The results were communicated to the emergency room physician Dr. Young who feels that the patient has symptoms of appendicitis.    2.  A 10 cm cysts with thin peripheral calcification involving the spleen.    n/a    CATEGORY: n/a    The following dose reduction techniques are used for all CT at [facility]:    1.   Automated exposure control.    2.   Adjustment of the mA and/or kV according to patient size.    3.   Use of iterative reconstruction technique.    Electronically signed  by: Dung Dumont  Date:    06/28/2023  Time:    22:07  CT Abdomen Pelvis  Without Contrast  Narrative: EXAMINATION:  CT ABDOMEN PELVIS WITHOUT CONTRAST    CLINICAL HISTORY AND TECHNIQUE:  Aylinbecka Curry, RT on 6/28/2023 12:37 PM    PT STATUS: ER    PROCEDURE: CT ABD/PELVIS WO    CLINICAL HX : RLQ ABD. PAIN ONSET THIS AM    PMH: N/A    IV CONTRAST: NONE    ORAL CONTRAST: NONE    RECTAL CONTRAST: NONE    AXIAL IMAGES @ 5MM INTERVALS WITH MULTIPLANAR RECONSTRUCTION    TOTAL IMAGE NUMBER: 158    NUMBER OF CT SCANS IN PAST 12 MONTHS: 1    CTDIvol(mGy): HEAD:     BODY: 5.3    DLP(mGycm): HEAD:     BODY: 309.2    TECH: AKG/KDF    PT TRANSPORTED W/O INCIDENT    This patient has had 1 CT and nuclear medicine scans performed within the last 12 months.    The following DOSE REDUCTION TECHNIQUES are used for all CT scans at [facility]:    1. Automated exposure control.  2. Adjustment of the mA and/or kv according to patient size.  3. Use of iterative reconstruction technique.    COMPARISON:  None    FINDINGS:  Liver: No clinically significant abnormalities are noted.    Gallbladder/biliary system: No clinically significant abnormalities are noted.    Spleen: A large (at least 10-11 cm in greatest diameter) cyst with thin peripheral calcification is noted within the spleen.    Adrenal glands: No clinically significant abnormalities are noted.    Pancreas: No clinically significant abnormalities are noted.    Kidneys/ureters: No clinically significant abnormalities are noted.    Urinary bladder: The urinary bladder is poorly distended with no gross abnormalities noted on limited imaging.    Prostate gland and seminal vesicles: No clinically significant abnormalities are noted.    GI tract: Unopacified loops of large and small bowel as well as the gastric lumen and appendix are difficult to evaluate with no definite abnormalities appreciated.    Vascular structures: Minimal atherosclerotic plaquing is noted  within the abdominal aorta.    Musculoskeletal structures: A mild-to-moderate, levoconvex, scoliotic curvature of the lumbar spine is present with minimal degenerative changes noted involving the lumbar spine.    Miscellaneous: N/A  Impression: 1. A large (at least 10-11 cm in greatest diameter) cyst with thin peripheral calcification is noted within the spleen.  As described above.  See above comments.  2. Low-grade chronic changes are present    Electronically signed by: Yg Bowen  Date:    06/28/2023  Time:    12:56        ASSESSMENT/PLAN:   1. Persistent right lower quadrant abdominal pain; CT of the abdomen shows possibility of appendicoliths and dilated fluid-filled appendix.Clinical correlation is recommended to exclude appendicitis.  Patient is planned for repeat of CT of the abdomen in the morning as recommended by the general surgeon with currently on board.  Keep patient NPO, IV fluids, empiric antibiotics and analgesics.     2. Leukocytosis; secondary to 1., continue antibiotics and repeat CBC in the morning.     3. Elevated blood pressure; patient denies history of hypertension, we will place patient on hydralazine p.r.n..     4. Nicotine addiction; patient has been counseled on the need to quit cigarette smoking.  Counseling was done for about 3 minutes.     5. Maintain the patient on DVT prophylaxis by way of SCD          Devan Apodaca MD  [facility]

## 2023-06-29 NOTE — PLAN OF CARE
06/29/23 1020   Discharge Assessment   Assessment Type Discharge Planning Assessment   Confirmed/corrected address, phone number and insurance Yes   Confirmed Demographics Correct on Facesheet   Source of Information patient   When was your last doctors appointment?   (has no PCP)   Does patient/caregiver understand observation status Yes   Communicated LA with patient/caregiver Date not available/Unable to determine   Reason For Admission abd pain   Facility Arrived From: home   Do you expect to return to your current living situation? Yes   Do you have help at home or someone to help you manage your care at home? No   Prior to hospitilization cognitive status: Alert/Oriented   Current cognitive status: Alert/Oriented   Equipment Currently Used at Home none   Readmission within 30 days? No   Patient currently being followed by outpatient case management? No   Do you currently have service(s) that help you manage your care at home? No   Do you take prescription medications? No   Do you have prescription coverage? No   Do you have any problems affording any of your prescribed medications? TBD   Who is going to help you get home at discharge? family   How do you get to doctors appointments? car, drives self   Are you on dialysis? No   Do you take coumadin? No   Discharge Plan A Home   DME Needed Upon Discharge  none   Discharge Plan discussed with: Patient   Transition of Care Barriers None   Physical Activity   On average, how many days per week do you engage in moderate to strenuous exercise (like a brisk walk)? 0 days   On average, how many minutes do you engage in exercise at this level? 0 min   Financial Resource Strain   How hard is it for you to pay for the very basics like food, housing, medical care, and heating? Not hard   Housing Stability   In the last 12 months, was there a time when you were not able to pay the mortgage or rent on time? N   In the last 12 months, how many places have you lived? 1    In the last 12 months, was there a time when you did not have a steady place to sleep or slept in a shelter (including now)? N   Transportation Needs   In the past 12 months, has lack of transportation kept you from medical appointments or from getting medications? no   In the past 12 months, has lack of transportation kept you from meetings, work, or from getting things needed for daily living? No   Food Insecurity   Within the past 12 months, you worried that your food would run out before you got the money to buy more. Never true   Within the past 12 months, the food you bought just didn't last and you didn't have money to get more. Never true   Stress   Do you feel stress - tense, restless, nervous, or anxious, or unable to sleep at night because your mind is troubled all the time - these days? Not at all   Social Connections   In a typical week, how many times do you talk on the phone with family, friends, or neighbors? More than 3   How often do you attend Pentecostalism or Restorationist services? Patient refu   Do you belong to any clubs or organizations such as Pentecostalism groups, unions, fraternal or athletic groups, or school groups? Patient refu   How often do you attend meetings of the clubs or organizations you belong to? Patient refu   Are you , , , , never , or living with a partner? Patient refu

## 2023-06-29 NOTE — ANESTHESIA PREPROCEDURE EVALUATION
06/29/2023  Anthony Ibarra is a 37 y.o., male.      Pre-op Assessment    I have reviewed the Patient Summary Reports.     I have reviewed the Nursing Notes. I have reviewed the NPO Status.   I have reviewed the Medications.     Review of Systems  Anesthesia Hx:  No problems with previous Anesthesia  Denies Family Hx of Anesthesia complications.   Denies Personal Hx of Anesthesia complications.   Social:  Smoker    Hematology/Oncology:  Hematology Normal   Oncology Normal     EENT/Dental:EENT/Dental Normal   Cardiovascular:  Cardiovascular Normal Exercise tolerance: poor     Pulmonary:  Pulmonary Normal    Renal/:  Renal/ Normal     Hepatic/GI:  Hepatic/GI Normal    Musculoskeletal:  Musculoskeletal Normal    Neurological:  Neurology Normal Tremors   Endocrine:  Endocrine Normal    Dermatological:  Skin Normal    Psych:  Psychiatric Normal           Physical Exam  General: Well nourished, Cooperative, Alert and Oriented    Airway:  Mallampati: II / II  Mouth Opening: Normal  TM Distance: Normal  Tongue: Normal  Neck ROM: Normal ROM    Dental:  Loose teeth, Periodontal disease  Extremely poor dentention       Anesthesia Plan  Type of Anesthesia, risks & benefits discussed:    Anesthesia Type: Gen ETT  Intra-op Monitoring Plan: Standard ASA Monitors  Post Op Pain Control Plan: multimodal analgesia  Induction:  IV  Airway Plan: Direct  Informed Consent: Informed consent signed with the Patient and all parties understand the risks and agree with anesthesia plan.  All questions answered. Patient consented to blood products? Yes  ASA Score: 3  Day of Surgery Review of History & Physical: H&P Update referred to the surgeon/provider.I have interviewed and examined the patient. I have reviewed the patient's H&P dated: There are no significant changes.     Ready For Surgery From Anesthesia Perspective.     .

## 2023-06-29 NOTE — ANESTHESIA POSTPROCEDURE EVALUATION
Anesthesia Post Evaluation    Patient: Anthony Ibarra    Procedure(s) Performed: Procedure(s) (LRB):  APPENDECTOMY, LAPAROSCOPIC (N/A)    [facility] Anesthesia Post Op Evaluation      Vitals Value Taken Time   /76 06/29/23 1219   Temp 98.8 06/29/23 1220   Pulse 98 06/29/23 1219   Resp 18 06/29/23 1220   SpO2 94 % 06/29/23 1219   Vitals shown include unvalidated device data.      No case tracking events are documented in the log.      Pain/Ethel Score: Pain Rating Prior to Med Admin: 8 (6/29/2023  8:33 AM)  Pain Rating Post Med Admin: 4 (6/29/2023  9:03 AM)  Ethel Score: 8 (6/29/2023 12:12 PM)

## 2023-06-29 NOTE — PLAN OF CARE
Problem: Adult Inpatient Plan of Care  Goal: Plan of Care Review  Outcome: Ongoing, Progressing  Goal: Patient-Specific Goal (Individualized)  Outcome: Ongoing, Progressing  Goal: Absence of Hospital-Acquired Illness or Injury  Outcome: Ongoing, Progressing  Goal: Optimal Comfort and Wellbeing  Outcome: Ongoing, Progressing  Goal: Readiness for Transition of Care  Outcome: Ongoing, Progressing     Problem: Pain Acute  Goal: Acceptable Pain Control and Functional Ability  Outcome: Ongoing, Progressing     Problem: Bleeding (Appendectomy)  Goal: Absence of Bleeding  Outcome: Ongoing, Progressing     Problem: Bowel Motility Impaired (Appendectomy)  Goal: Effective Bowel Elimination  Outcome: Ongoing, Progressing     Problem: Fluid and Electrolyte Imbalance (Appendectomy)  Goal: Fluid and Electrolyte Balance  Outcome: Ongoing, Progressing     Problem: Infection (Appendectomy)  Goal: Absence of Infection Signs and Symptoms  Outcome: Ongoing, Progressing     Problem: Ongoing Anesthesia Effects (Appendectomy)  Goal: Anesthesia/Sedation Recovery  Outcome: Ongoing, Progressing     Problem: Pain (Appendectomy)  Goal: Acceptable Pain Control  Outcome: Ongoing, Progressing     Problem: Postoperative Nausea and Vomiting (Appendectomy)  Goal: Nausea and Vomiting Relief  Outcome: Ongoing, Progressing     Problem: Postoperative Urinary Retention (Appendectomy)  Goal: Effective Urinary Elimination  Outcome: Ongoing, Progressing     Problem: Respiratory Compromise (Appendectomy)  Goal: Effective Oxygenation and Ventilation  Outcome: Ongoing, Progressing

## 2023-06-29 NOTE — ANESTHESIA PROCEDURE NOTES
Intubation    Date/Time: 6/29/2023 11:19 AM  Performed by: David Sunshine CRNA  Authorized by: David Sunshine CRNA     Intubation:     Induction:  Inhalational - ETT/trach    Intubated:  Postinduction    Mask Ventilation:  Easy mask    Attempts:  1    Attempted By:  CRNA    Method of Intubation:  Direct    Blade:  Espinosa 2    Laryngeal View Grade: Grade I - full view of cords      Difficult Airway Encountered?: No      Complications:  None    Airway Device:  Oral endotracheal tube    Airway Device Size:  7.5    Style/Cuff Inflation:  Cuffed (inflated to minimal occlusive pressure)    Tube secured:  22    Placement Verified By:  Capnometry    Complicating Factors:  None    Findings Post-Intubation:  BS equal bilateral

## 2023-06-29 NOTE — H&P
Chief Complaint; right lower quadrant abdominal pain since earlier this morning    HPI:   Patient is a 37 y.o. male with a medical history of nicotine addiction presented to the ER on account of persistent right lower quadrant abdominal pain since earlier this morning.    Patient has been at his usual state of health until earlier today when he developed right lower quadrant abdominal pain, sharp, persistent, about 8/10 in intensity, radiating to involve the lower abdominal area with no aggravating or relieving factor.  Associated with nausea but no vomiting.  No diarrhea no constipation.  No fever.  He decided to come to the ER for further evaluation.  Earlier in the morning he had a CT of the abdomen without contrast which does not demonstrate any significant finding.  However, he had another CT of the abdomen with contrast this evening and the results demonstrates multiple loops of nondilated fluid-filled small bowel and at the junction of the large and small bowel several intraluminal small calcific densities are present which makes it difficult to exclude the possibility of appendicoliths and dilated fluid-filled appendix.Clinical correlation is recommended to exclude appendicitis.  General surgeon has been consulted from the ER, recommendation is to repeat CT of the abdomen in the morning.    PCP Primary Doctor No MD        History reviewed. No pertinent past medical history. None    History reviewed. No pertinent surgical history. None    (Not in a hospital admission)  Home  \Medication; None    Review of patient's allergies indicates:  No Known Allergies     Social History     Tobacco Use    Smoking status: Every Day     Packs/day: 1.00     Years: 18.00     Pack years: 18.00     Types: Cigarettes    Smokeless tobacco: Never   Substance Use Topics    Alcohol use: Not on file        History reviewed. No pertinent family history.    Review of Systems  Review of Systems   Constitutional: Negative for fever.  "  HEENT: Negative for drooling, ear pain, facial swelling and nosebleeds.    Eyes: Negative for discharge and visual disturbance.   Respiratory: Negative for cough, negative shortness of breath.    Cardiovascular: negative for chest pain or SOB  Gastrointestinal; positive for abdominal pain, nausea, no vomiting.  Genitourinary: Negative for decreased urine volume and dysuria  Musculoskeletal: Negative for neck pain.   Skin: Negative for rash.   Neurological: negative for numbness, negative for weakness,negative for seizures and facial asymmetry.              Objective:     No intake/output data recorded.    BP (!) 141/74   Pulse 99   Temp 100.2 °F (37.9 °C)   Resp 18   Ht 5' 5" (1.651 m)   Wt 68 kg (150 lb)   SpO2 98%   BMI 24.96 kg/m²     General Appearance:    Awake, alert, not in acute distress   HEENT:   atraumatic, PERRL, EOM intact, conjuctiva pink, sclera anicteric, oropharynx moist, no lesions noted   Neck:    Supple, no JVD, no carotid bruits, no lymphadenopathy or thyromegaly noted       Pulmonary:   Clear to auscultation bilaterally, reduced breath sounds bileterally.   Cardiovascular:    Regular rate and rhythm, S1 S2 normal   Abdomen:     Soft, right lower quadrant tenderness,nondistended, bowel sounds active all four quadrants, no masses, no organomegaly   Extremities:  no edema, no cyanosis or clubbing noted       Skin:   No bruises noted.       Neurologic: Alert, awake, oriented x 3, moves all extremities.                 Data Review :   Labs:    CBC:   Lab Results   Component Value Date    WBC 14.58 (H) 06/28/2023    RBC 5.52 06/28/2023    HGB 16.7 06/28/2023    HCT 47.9 06/28/2023     06/28/2023     CMP:   Lab Results   Component Value Date     06/28/2023    K 3.6 06/28/2023    CO2 22 06/28/2023    BUN 11.0 06/28/2023    CREATININE 0.77 06/28/2023    CALCIUM 9.0 06/28/2023    ALKPHOS 73 06/28/2023    AST 27 06/28/2023    ALT 34 06/28/2023    ALBUMIN 4.6 06/28/2023    BILITOT 0.7 " 06/28/2023     Cardiac markers: No results found for: BNP, CKMB, CKTOTAL, TROPONIN, MYOGLOBIN    Radiology:  Micro:  No components found for: BLOODCX, SPUTUMCX, URINECX    Radiology:  [unfilled]        Assessment & Plan:   1. Persistent right lower quadrant abdominal pain; CT of the abdomen shows possibility of appendicoliths and dilated fluid-filled appendix.Clinical correlation is recommended to exclude appendicitis.  Patient is planned for repeat of CT of the abdomen in the morning as recommended by the general surgeon with currently on board.  Keep patient NPO, IV fluids, empiric antibiotics and analgesics.    2. Leukocytosis; secondary to 1., continue antibiotics and repeat CBC in the morning.    3. Elevated blood pressure; patient denies history of hypertension, we will place patient on hydralazine p.r.n..    4. Nicotine addiction; patient has been counseled on the need to quit cigarette smoking.  Counseling was done for about 3 minutes.    5. Maintain the patient on DVT prophylaxis by way of SCD    Disposition; follow up with further recommendation by the general surgeon.  Follow up CT of the abdomen in the morning  This note was completed using voice recognition software and transcription errors may occur.    This Encounter was via Telemedicine (Both audio and visual ) and from [place], [place].  Patient was located in [place].    Evaluation  of the patient was done alongside the patient's nursing staff     Patient will be placed on observation status    Yudith Musa  6/28/2023  11:01 PM

## 2023-06-29 NOTE — ED PROVIDER NOTES
Encounter Date: 6/28/2023       History     Chief Complaint   Patient presents with    Abdominal Pain     C/O CONTINUED ABD PAIN AND FEVER SINCE LEAVING EARLIER TODAY.      37-year-old male presents complaining of right lower quadrant pain and fever.  He was seen in the ER this morning, had a full workup for right lower quadrant pain.  His white count at that time was a little over 13,000, but the CT was negative for infectious or inflammatory processes.  Since that time, his pain has gotten worse, and has stayed in the right lower quadrant.  Additionally, he has developed a fever.  It hurts more when he walks around.    The history is provided by the patient.   Review of patient's allergies indicates:  No Known Allergies  History reviewed. No pertinent past medical history.  History reviewed. No pertinent surgical history.  History reviewed. No pertinent family history.  Social History     Tobacco Use    Smoking status: Every Day     Packs/day: 1.00     Years: 18.00     Pack years: 18.00     Types: Cigarettes    Smokeless tobacco: Never   Substance Use Topics    Drug use: Not Currently     Types: Marijuana     Review of Systems   Constitutional:  Positive for fever.   HENT:  Negative for sore throat.    Respiratory:  Negative for shortness of breath.    Cardiovascular:  Negative for chest pain.   Gastrointestinal:  Positive for abdominal pain and nausea. Negative for constipation, diarrhea and vomiting.   Genitourinary:  Negative for dysuria.   Musculoskeletal:  Negative for back pain.   Skin:  Negative for rash.   Neurological:  Negative for weakness.   Hematological:  Does not bruise/bleed easily.   All other systems reviewed and are negative.    Physical Exam     Initial Vitals [06/28/23 2052]   BP Pulse Resp Temp SpO2   (!) 141/74 99 18 100.2 °F (37.9 °C) 98 %      MAP       --         Physical Exam    Nursing note and vitals reviewed.  Constitutional: Vital signs are normal. He appears well-developed and  well-nourished. He is cooperative.   HENT:   Head: Normocephalic and atraumatic.   Mouth/Throat: Oropharynx is clear and moist.   Eyes: Conjunctivae, EOM and lids are normal. Pupils are equal, round, and reactive to light.   Neck: Trachea normal. Neck supple.   Normal range of motion.  Cardiovascular:  Normal rate, regular rhythm, normal heart sounds and intact distal pulses.           Pulmonary/Chest: Breath sounds normal.   Abdominal: He exhibits no distension. There is abdominal tenderness.   Right lower quadrant tenderness There is rebound.   Musculoskeletal:         General: Normal range of motion.      Cervical back: Normal, normal range of motion and neck supple.      Lumbar back: Normal.     Neurological: He is alert and oriented to person, place, and time. He has normal strength. Coordination normal.   Skin: Skin is warm, dry and intact. Capillary refill takes less than 2 seconds.   Psychiatric: He has a normal mood and affect. His speech is normal and behavior is normal. Judgment and thought content normal. Cognition and memory are normal.       ED Course   Procedures  Labs Reviewed   URINALYSIS, REFLEX TO URINE CULTURE - Abnormal; Notable for the following components:       Result Value    Blood, UA Moderate (*)     All other components within normal limits    Narrative:      URINE STABILITY IS 2 HOURS AT ROOM TEMP OR    SIX HOURS REFRIGERATED. PERFORMING TESTING ON    SPECIMENS GREATER THAN THIS AGE MAY AFFECT THE    FOLLOWING TESTS:    PH          SPECIFIC GRAVITY           BLOOD    CLARITY     BILIRUBIN               UROBILINOGEN   COMPREHENSIVE METABOLIC PANEL - Abnormal; Notable for the following components:    Glucose Level 124 (*)     All other components within normal limits   CBC WITH DIFFERENTIAL - Abnormal; Notable for the following components:    WBC 14.58 (*)     MPV 9.2 (*)     Neut % 81.3 (*)     Lymph % 10.5 (*)     Eos % 0.1 (*)     Neut # 11.84 (*)     Mono # 1.11 (*)     Eos # 0.02 (*)      IG# 0.05 (*)     All other components within normal limits   LIPASE - Normal   CBC W/ AUTO DIFFERENTIAL    Narrative:     The following orders were created for panel order CBC auto differential.  Procedure                               Abnormality         Status                     ---------                               -----------         ------                     CBC with Differential[[id number]]        Abnormal            Final result                 Please view results for these tests on the individual orders.   URINALYSIS, MICROSCOPIC          Imaging Results              CT Abdomen Pelvis With Contrast (Final result)  Result time 06/28/23 22:07:21      Final result by Dung Dumont MD (06/28/23 22:07:21)                   Impression:        1. The patient demonstrates multiple loops of nondilated fluid-filled small bowel and at the junction of the large and small bowel several intraluminal small calcific densities are present which makes it difficult to exclude the possibility of appendicoliths and dilated fluid-filled appendix.  Clinical correlation is recommended to exclude appendicitis.  The results were communicated to the emergency room physician Dr. Young who feels that the patient has symptoms of appendicitis.    2.  A 10 cm cysts with thin peripheral calcification involving the spleen.    n/a    CATEGORY: n/a    The following dose reduction techniques are used for all CT at [facility]:    1.   Automated exposure control.    2.   Adjustment of the mA and/or kV according to patient size.    3.   Use of iterative reconstruction technique.      Electronically signed by: Dung Dumont  Date:    06/28/2023  Time:    22:07               Narrative:      STUDY: CT SCAN OF THE ABDOMEN AND PELVIS WITH INTRAVENOUS CONTRAST    CLINICAL HISTORY & TECHNIQUE:    Pablo Avendano, RT on 6/28/2023  9:35 PM    PROCEDURE: CT ABD/PEL W CONT    CLINICAL HX: ER    X 10 HOURS (THESE SYMPTOMS DEVELOPED  SINCE FIRST ER VISIT EARLIER TODAY) - FEVER    AND INCREASED WBC    X APPROX 18 HOURS - RLQ ABD PAIN AND ELEVATED WBC    PMH: SPLENIC CYST SEEN ON PRIOR EXAM    IV CONTRAST: 100CC ISOVUE 370    ORAL CONTRAST: NONE    RECTAL CONTRAST: NONE    AXIAL IMAGING @ 5MM INTERVALS W/MULTIPLANAR RECONSTRUCTION OF IMAGES    TOTAL IMAGE NUMBER: [id number]    CTDIvol(mGy): HEAD:  BODY: 10.6    DLP(mGycm): HEAD:  BODY: 554.7    # CT'S LAST 12 MONTHS: 2    TECH: AJR    PT TRANSPORTED W/O INCIDENT    COMPARISON:  Earlier same date    PERTINENT PAST RADIOLOGIC HISTORY FOR RADIATION EXPOSURE:  2 CT scan(s) and Cardiac perfusion scan(s) on record for the last 12 months    FINDINGS:    Liver:  No clinically significant abnormalities noted.    Gallbladder/Biliary System:  No clinically significant abnormalities noted.    Spleen:  A 10 cm splenic calcification with thin peripheral calcified rim.    Adrenal glands:  No clinically significant abnormalities noted.    Pancreas:  No clinically significant abnormalities noted.    Kidneys/Urinary Tract:  No clinically significant abnormalities noted.    Urinary bladder:  No clinically significant abnormalities noted.    Prostate gland/uterus and ovaries:  No clinically significant abnormalities noted.    GI tract:  Fluid is present in nondilated loops of small bowel and there are small calcific densities within bowel in the cecal region, the presence of which makes it difficult to exclude the possibility of appendicoliths and dilated fluid-filled appendix.  Recommend clinical correlation.    Vascular structures:  No clinically significant abnormalities noted.    Musculoskeletal structures:  No clinically significant abnormalities noted.    Miscellaneous:  No clinically significant abnormalities noted.                                       Medications   metronidazole IVPB 500 mg (has no administration in time range)   piperacillin-tazobactam (ZOSYN) 4.5 g in dextrose 5 % in water (D5W) 5 % 100 mL IVPB  (MB+) (4.5 g Intravenous New Bag 6/28/23 2115)   HYDROmorphone injection 0.5 mg (0.5 mg Intravenous Given 6/28/23 2116)   LORazepam injection 0.5 mg (0.5 mg Intravenous Given 6/28/23 2117)   sodium chloride 0.9% bolus 1,000 mL 1,000 mL (1,000 mLs Intravenous New Bag 6/28/23 2114)   iopamidoL (ISOVUE-370) injection 100 mL (100 mLs Intravenous Given 6/28/23 2135)     Medical Decision Making:   Initial Assessment:   Right lower quadrant pain and tenderness, fever  Differential Diagnosis:   Appendicitis is certainly the #1 possibility  ED Management:  Zosyn, analgesia  Labs, repeat CT, this time with IV contrast  Discussed findings and patient with Dr. Covarrubias  He recommends admission to the hospitalist, Zosyn and Flagyl, and CT with oral contrast in the morning.  He will see the patient in consult in the morning.           ED Course as of 06/28/23 2215 Wed Jun 28, 2023 2205 CT Abdomen Pelvis With Contrast  Likely appendicolith noted on CT [TM]      ED Course User Index  [TM] Warren Young MD                 Clinical Impression:   Final diagnoses:  [K35.30] Acute appendicitis with localized peritonitis, without perforation, abscess, or gangrene (Primary)        ED Disposition Condition    Observation Stable                Warren Young MD  06/28/23 2215

## 2023-06-29 NOTE — OP NOTE
Procedure date:  06/29/2023     Indications:  37-year-old white male presenting to emergency department complaining of abdominal pain discomfort with clinical evidence of acute appendicitis and imaging suggesting the same.  Undergoing laparoscopic appendectomy.      Preoperative diagnosis:  Acute appendicitis   Postoperative diagnosis:  Acute perforated appendicitis with localized peritonitis     Procedure performed:  Laparoscopic appendectomy      Procedure in detail:  Patient was brought to the operative theater laid in a supine position.  General endotracheal intubation anesthesia was provided.  Preoperative antibiotics were administered.  There the abdomen was then sterilely prepped and draped in normal surgical fashion using chlorhexidine.  1% lidocaine with epinephrine to infiltrate the subcutaneous tissues overlying the supraumbilical space.  A 15 blade was used to incise the skin with dissection down to underlying fascia.  Veress needle technique was used into the abdomen on the 1st pass and insufflated to 15 mm of mercury pressure using CO2 gas.  A 5 mm Visiport trocar was then introduced to the same region.  Secondary dissection trocars were then placed 1 in the suprapubic and 1 in the right hypogastrium.  The appendix was identified and some turbid fluid along the right colic gutter.  It was grossly edematous and necrotic.  Showing signs of perforation with a small amount of fecalith within the fluid.  The appendix was then elevated the mesoappendix was then dissected free and transected using a laparoscopic harmonic scalpel.  The base of the appendix was then transected using a blue load linear YOSEF stapler.  It was then placed in an Endo-Catch bag and retrieved from the right port site.  The abdomen was then irrigated of its fibrinous and purulent exudate.  The appendix stump had appropriate closure and hemostasis.  Secondary dissection trocars were then removed.  The right port site was closed in  interrupted fashion using 0 Vicryl of the layer of the fascia.  The skin was closed with Monocryl subcuticular stitch over each of the trocar sites.  A sterile dressing was then placed over each of the wounds.  The patient was then relieved of anesthesia stable condition and transferred to postanesthesia care unit.    Complications:  None   Estimated blood loss:  3 cc   Specimens:  Appendix and mesoappendix    Disposition: Upon recovering from anesthesia patient will be transferred back to the floor for further medical management.  He will be started on clear liquid diet with advancement as tolerated.  He will likely be stable for discharge to tomorrow with antibiotic therapy of Augmentin and Flagyl    Dipika Covarrubias MD

## 2023-06-29 NOTE — PLAN OF CARE
Problem: Adult Inpatient Plan of Care  Goal: Plan of Care Review  Outcome: Ongoing, Progressing  Goal: Absence of Hospital-Acquired Illness or Injury  Outcome: Ongoing, Progressing  Goal: Optimal Comfort and Wellbeing  Outcome: Ongoing, Progressing  Goal: Readiness for Transition of Care  Outcome: Ongoing, Progressing     Problem: Pain Acute  Goal: Acceptable Pain Control and Functional Ability  Outcome: Ongoing, Progressing     Problem: Bleeding (Appendectomy)  Goal: Absence of Bleeding  Outcome: Ongoing, Progressing     Problem: Bowel Motility Impaired (Appendectomy)  Goal: Effective Bowel Elimination  Outcome: Ongoing, Progressing     Problem: Fluid and Electrolyte Imbalance (Appendectomy)  Goal: Fluid and Electrolyte Balance  Outcome: Ongoing, Progressing     Problem: Infection (Appendectomy)  Goal: Absence of Infection Signs and Symptoms  Outcome: Ongoing, Progressing     Problem: Ongoing Anesthesia Effects (Appendectomy)  Goal: Anesthesia/Sedation Recovery  Outcome: Ongoing, Progressing     Problem: Pain (Appendectomy)  Goal: Acceptable Pain Control  Outcome: Ongoing, Progressing     Problem: Postoperative Nausea and Vomiting (Appendectomy)  Goal: Nausea and Vomiting Relief  Outcome: Ongoing, Progressing     Problem: Postoperative Urinary Retention (Appendectomy)  Goal: Effective Urinary Elimination  Outcome: Ongoing, Progressing     Problem: Respiratory Compromise (Appendectomy)  Goal: Effective Oxygenation and Ventilation  Outcome: Ongoing, Progressing

## 2023-06-29 NOTE — ANESTHESIA POSTPROCEDURE EVALUATION
Anesthesia Post Evaluation    Patient: Anthony Ibarra    Procedure(s) Performed: Procedure(s) (LRB):  APPENDECTOMY, LAPAROSCOPIC (N/A)    Final Anesthesia Type: general      Patient location during evaluation: PACU  Patient participation: No - Unable to Participate, Sedation  Level of consciousness: lethargic  Post-procedure vital signs: reviewed and stable  Pain management: adequate  Airway patency: patent  KULWINDER mitigation strategies: Verification of full reversal of neuromuscular block  PONV status at discharge: No PONV  Anesthetic complications: no      Cardiovascular status: blood pressure returned to baseline, hemodynamically stable and stable  Respiratory status: unassisted, spontaneous ventilation and nasal cannula  Hydration status: euvolemic  Follow-up not needed.          Vitals Value Taken Time   /76 06/29/23 1219   Temp 98.9 06/29/23 1222   Pulse 98 06/29/23 1221   Resp 18 06/29/23 1222   SpO2 94 % 06/29/23 1221   Vitals shown include unvalidated device data.      No case tracking events are documented in the log.      Pain/Ethel Score: Pain Rating Prior to Med Admin: 8 (6/29/2023  8:33 AM)  Pain Rating Post Med Admin: 4 (6/29/2023  9:03 AM)  Ethel Score: 8 (6/29/2023 12:12 PM)

## 2023-06-30 VITALS
RESPIRATION RATE: 18 BRPM | TEMPERATURE: 98 F | BODY MASS INDEX: 26.17 KG/M2 | OXYGEN SATURATION: 94 % | SYSTOLIC BLOOD PRESSURE: 102 MMHG | WEIGHT: 162.81 LBS | HEART RATE: 78 BPM | HEIGHT: 66 IN | DIASTOLIC BLOOD PRESSURE: 72 MMHG

## 2023-06-30 LAB
ANION GAP SERPL CALC-SCNC: 9 MEQ/L (ref 2–13)
BASOPHILS # BLD AUTO: 0.02 X10(3)/MCL (ref 0.01–0.08)
BASOPHILS NFR BLD AUTO: 0.2 % (ref 0.1–1.2)
BUN SERPL-MCNC: 7 MG/DL (ref 7–20)
CALCIUM SERPL-MCNC: 8.3 MG/DL (ref 8.4–10.2)
CHLORIDE SERPL-SCNC: 104 MMOL/L (ref 98–110)
CO2 SERPL-SCNC: 21 MMOL/L (ref 21–32)
CREAT SERPL-MCNC: 0.77 MG/DL (ref 0.66–1.25)
CREAT/UREA NIT SERPL: 9 (ref 12–20)
EOSINOPHIL # BLD AUTO: 0.01 X10(3)/MCL (ref 0.04–0.54)
EOSINOPHIL NFR BLD AUTO: 0.1 % (ref 0.7–7)
ERYTHROCYTE [DISTWIDTH] IN BLOOD BY AUTOMATED COUNT: 12.8 %
GFR SERPLBLD CREATININE-BSD FMLA CKD-EPI: >90 MLS/MIN/1.73/M2
GLUCOSE SERPL-MCNC: 94 MG/DL (ref 70–115)
HCT VFR BLD AUTO: 43.7 % (ref 36–52)
HGB BLD-MCNC: 15 G/DL (ref 13–18)
IMM GRANULOCYTES # BLD AUTO: 0.02 X10(3)/MCL (ref 0–0.03)
IMM GRANULOCYTES NFR BLD AUTO: 0.2 % (ref 0–0.5)
LYMPHOCYTES # BLD AUTO: 1.1 X10(3)/MCL (ref 1.32–3.57)
LYMPHOCYTES NFR BLD AUTO: 11.1 % (ref 20–55)
MCH RBC QN AUTO: 30.1 PG (ref 27–34)
MCHC RBC AUTO-ENTMCNC: 34.3 G/DL (ref 31–37)
MCV RBC AUTO: 87.6 FL (ref 79–99)
MONOCYTES # BLD AUTO: 0.73 X10(3)/MCL (ref 0.3–0.82)
MONOCYTES NFR BLD AUTO: 7.4 % (ref 4.7–12.5)
NEUTROPHILS # BLD AUTO: 8.05 X10(3)/MCL (ref 1.78–5.38)
NEUTROPHILS NFR BLD AUTO: 81 % (ref 37–73)
NRBC BLD AUTO-RTO: 0 %
PLATELET # BLD AUTO: 187 X10(3)/MCL (ref 140–371)
PMV BLD AUTO: 9.2 FL (ref 9.4–12.4)
POTASSIUM SERPL-SCNC: 3.7 MMOL/L (ref 3.5–5.1)
RBC # BLD AUTO: 4.99 X10(6)/MCL (ref 4–6)
SODIUM SERPL-SCNC: 134 MMOL/L (ref 135–145)
WBC # SPEC AUTO: 9.93 X10(3)/MCL (ref 4–11.5)

## 2023-06-30 PROCEDURE — 63600175 PHARM REV CODE 636 W HCPCS: Performed by: SURGERY

## 2023-06-30 PROCEDURE — 25000003 PHARM REV CODE 250: Performed by: SURGERY

## 2023-06-30 PROCEDURE — S0030 INJECTION, METRONIDAZOLE: HCPCS | Performed by: SURGERY

## 2023-06-30 PROCEDURE — C9113 INJ PANTOPRAZOLE SODIUM, VIA: HCPCS | Performed by: SURGERY

## 2023-06-30 PROCEDURE — 25000003 PHARM REV CODE 250: Performed by: FAMILY MEDICINE

## 2023-06-30 PROCEDURE — 94761 N-INVAS EAR/PLS OXIMETRY MLT: CPT

## 2023-06-30 PROCEDURE — 96361 HYDRATE IV INFUSION ADD-ON: CPT

## 2023-06-30 PROCEDURE — 96376 TX/PRO/DX INJ SAME DRUG ADON: CPT

## 2023-06-30 PROCEDURE — G0378 HOSPITAL OBSERVATION PER HR: HCPCS

## 2023-06-30 PROCEDURE — 80048 BASIC METABOLIC PNL TOTAL CA: CPT | Performed by: SURGERY

## 2023-06-30 PROCEDURE — 85025 COMPLETE CBC W/AUTO DIFF WBC: CPT | Performed by: SURGERY

## 2023-06-30 PROCEDURE — 96366 THER/PROPH/DIAG IV INF ADDON: CPT

## 2023-06-30 PROCEDURE — 36415 COLL VENOUS BLD VENIPUNCTURE: CPT | Performed by: SURGERY

## 2023-06-30 RX ORDER — KETOROLAC TROMETHAMINE 10 MG/1
10 TABLET, FILM COATED ORAL EVERY 6 HOURS PRN
Qty: 10 TABLET | Refills: 0 | Status: SHIPPED | OUTPATIENT
Start: 2023-06-30 | End: 2023-07-26 | Stop reason: ALTCHOICE

## 2023-06-30 RX ORDER — METRONIDAZOLE 500 MG/1
500 TABLET ORAL EVERY 8 HOURS
Qty: 21 TABLET | Refills: 0 | Status: SHIPPED | OUTPATIENT
Start: 2023-06-30 | End: 2023-07-07

## 2023-06-30 RX ORDER — CIPROFLOXACIN 500 MG/1
500 TABLET ORAL EVERY 12 HOURS
Qty: 14 TABLET | Refills: 0 | Status: SHIPPED | OUTPATIENT
Start: 2023-06-30 | End: 2023-07-07

## 2023-06-30 RX ORDER — ACETAMINOPHEN 325 MG/1
650 TABLET ORAL EVERY 6 HOURS PRN
Status: DISCONTINUED | OUTPATIENT
Start: 2023-06-30 | End: 2023-06-30 | Stop reason: HOSPADM

## 2023-06-30 RX ADMIN — HYDROCODONE BITARTRATE AND ACETAMINOPHEN 1 TABLET: 7.5; 325 TABLET ORAL at 08:06

## 2023-06-30 RX ADMIN — PIPERACILLIN AND TAZOBACTAM 4.5 G: 4; .5 INJECTION, POWDER, FOR SOLUTION INTRAVENOUS; PARENTERAL at 04:06

## 2023-06-30 RX ADMIN — METRONIDAZOLE 500 MG: 500 INJECTION, SOLUTION INTRAVENOUS at 09:06

## 2023-06-30 RX ADMIN — PANTOPRAZOLE SODIUM 40 MG: 40 INJECTION, POWDER, FOR SOLUTION INTRAVENOUS at 08:06

## 2023-06-30 RX ADMIN — SUCRALFATE ORAL 1 G: 1 SUSPENSION ORAL at 05:06

## 2023-06-30 RX ADMIN — FAMOTIDINE 20 MG: 10 INJECTION, SOLUTION INTRAVENOUS at 08:06

## 2023-06-30 RX ADMIN — SODIUM CHLORIDE: 4.5 INJECTION, SOLUTION INTRAVENOUS at 05:06

## 2023-06-30 NOTE — PLAN OF CARE
Problem: Adult Inpatient Plan of Care  Goal: Plan of Care Review  Outcome: Adequate for Care Transition  Goal: Patient-Specific Goal (Individualized)  Outcome: Adequate for Care Transition  Goal: Absence of Hospital-Acquired Illness or Injury  Outcome: Adequate for Care Transition  Goal: Optimal Comfort and Wellbeing  Outcome: Adequate for Care Transition  Goal: Readiness for Transition of Care  Outcome: Adequate for Care Transition     Problem: Pain Acute  Goal: Acceptable Pain Control and Functional Ability  Outcome: Adequate for Care Transition     Problem: Bleeding (Appendectomy)  Goal: Absence of Bleeding  Outcome: Adequate for Care Transition     Problem: Bowel Motility Impaired (Appendectomy)  Goal: Effective Bowel Elimination  Outcome: Adequate for Care Transition     Problem: Fluid and Electrolyte Imbalance (Appendectomy)  Goal: Fluid and Electrolyte Balance  Outcome: Adequate for Care Transition     Problem: Infection (Appendectomy)  Goal: Absence of Infection Signs and Symptoms  Outcome: Adequate for Care Transition     Problem: Ongoing Anesthesia Effects (Appendectomy)  Goal: Anesthesia/Sedation Recovery  Outcome: Adequate for Care Transition     Problem: Pain (Appendectomy)  Goal: Acceptable Pain Control  Outcome: Adequate for Care Transition     Problem: Postoperative Nausea and Vomiting (Appendectomy)  Goal: Nausea and Vomiting Relief  Outcome: Adequate for Care Transition     Problem: Postoperative Urinary Retention (Appendectomy)  Goal: Effective Urinary Elimination  Outcome: Adequate for Care Transition     Problem: Respiratory Compromise (Appendectomy)  Goal: Effective Oxygenation and Ventilation  Outcome: Adequate for Care Transition

## 2023-06-30 NOTE — NURSING
Provided patient with discharge instructions focusing in medications, keeping follow-up appointment, activity, and care of incision site. Patient verbalized understanding and denies further questions at this time.

## 2023-06-30 NOTE — NURSING
Patient discharged from the floor. Patient refused wheelchair. Patient in stable condition and denies further needs at this time.

## 2023-06-30 NOTE — PLAN OF CARE
06/30/23 1413   Final Note   Assessment Type Final Discharge Note   Anticipated Discharge Disposition Home   What phone number can be called within the next 1-3 days to see how you are doing after discharge? [phone]   Post-Acute Status   Discharge Delays None known at this time

## 2023-06-30 NOTE — DISCHARGE INSTRUCTIONS
Keep incision site clean, shower daily, do not submerge site completely in water.    Take medications as prescribed.   Notify MD if you experience signs of infection.:  These include a fever of 100.4°F (38°C) or higher  Chills  pain with passing urine.  Signs of wound infection. These include swelling, redness, warmth around the wound; too much pain when touched; yellowish, greenish, or bloody discharge; foul smell coming from the cut site; cut site opens up.  Blood in vomit or stool  Pain in the belly and feeling bloated  Yellowing of the skin and the eyes  You are not feeling better in 2 to 3 days or you are feeling worse

## 2023-06-30 NOTE — DISCHARGE SUMMARY
Hospital Medicine  Discharge Summary    Patient Name: Anthony Ibarra  MRN: [id number]  Admit Date: 6/28/2023  Discharge Date:    Status: OP- Observation   Length of Stay: 0      PHYSICIANS   Admitting Physician: Devan Apodaca MD  Discharging Physician: Devan Apodaca MD.  Primary Care Physician: Primary Doctor No        DISCHARGE DIAGNOSES     Acute appendicitis s/p lap appendectomy     PROCEDURES         HOSPITAL COURSE      Tolerating diet feel well s/p lap appendectomy    STATUS  ImprovedStable    DISPOSITION  Discharge to home  F/u with dr covarrubias given for surgery clinic this week     DIET  Cardiac     ACTIVITY  As tolerated      FOLLOW-UP      Follow-up Information       Ale Falcon DNP. Go in 1 week(s).    Specialty: Family Medicine  Why: Someone will call you at a later time/date with your follow-up appointment.  Contact information:  [address] [place] [address] [facility] [id number] [phone]               Dipika Covarrubias MD. Go to.    Specialty: General Surgery  Why: Someone will call you at a later time/date with your follow-up appointment.  Contact information:  [address] [facility] [place] [address]  [phone]                               DISCHARGE MEDICATION RECONCILIATION        Medication List        START taking these medications      ciprofloxacin HCl 500 MG tablet  Commonly known as: CIPRO  Take 1 tablet (500 mg total) by mouth every 12 (twelve) hours. for 7 days     ketorolac 10 mg tablet  Commonly known as: TORADOL  Take 1 tablet (10 mg total) by mouth every 6 (six) hours as needed for Pain.     metroNIDAZOLE 500 MG tablet  Commonly known as: FLAGYL  Take 1 tablet (500 mg total) by mouth every 8 (eight) hours. for 7 days            CONTINUE taking these medications      dicyclomine 20 mg tablet  Commonly known as: BENTYL  Take 1 tablet (20 mg total) by mouth every 6 to 8 hours as needed (abdominal pain).               Where to Get Your Medications        These medications were sent to  [facility] - [place], [place] - [address]  [address] [place] [address]      Phone: [phone]   ciprofloxacin HCl 500 MG tablet  ketorolac 10 mg tablet  metroNIDAZOLE 500 MG tablet           PHYSICAL EXAM   VITALS: T 97.6 °F (36.4 °C)   /72   P 78   RR 18   O2 (!) 94 %    Physical Exam  Vitals reviewed.   HENT:      Head: Normocephalic.   Cardiovascular:      Rate and Rhythm: Normal rate.   Pulmonary:      Effort: Pulmonary effort is normal.   Abdominal:      Comments: Lap sites C/D/I   Neurological:      Mental Status: He is alert.            Discharge time: 33 minutes     Devan Apodaca MD  [facility]       DIAGNOSITCS   CBC:   Recent Labs   Lab 06/28/23 2103 06/29/23 0424 06/30/23  0504   WBC 14.58* 14.17* 9.93   HGB 16.7 15.7 15.0   HCT 47.9 46.1 43.7    211 187     COAG:  No results for input(s): APTT, INR, PTT in the last 168 hours.  CMP:   Recent Labs   Lab 06/28/23  1225 06/28/23 2103 06/29/23 0424 06/30/23  0504   CALCIUM 9.2 9.0 8.7 8.3*   ALBUMIN 4.7 4.6  --   --     136 138 134*   K 4.2 3.6 4.3 3.7   CO2 28 22 26 21   BUN 10.0 11.0 9.0 7.0   CREATININE 0.67 0.77 0.72 0.77   ALKPHOS 81 73  --   --    ALT 33 34  --   --    AST 29 27  --   --    BILITOT 0.5 0.7  --   --      Estimated Creatinine Clearance: 119.7 mL/min (based on SCr of 0.77 mg/dL).  CARDIAC ENZYMES: No results for input(s): TROPONINI, CPK, CKMB in the last 72 hours.     Recent Labs   Lab 06/28/23 2103   LIPASE 31         No results for input(s): POCTGLUCOSE in the last 72 hours.     Microbiology Results (last 7 days)       ** No results found for the last 168 hours. **               CT Abdomen Pelvis With Contrast    Result Date: 6/28/2023  STUDY: CT SCAN OF THE ABDOMEN AND PELVIS WITH INTRAVENOUS CONTRAST CLINICAL HISTORY & TECHNIQUE: Pablo Avendano RT on 6/28/2023  9:35 PM PROCEDURE: CT ABD/PEL W CONT CLINICAL HX: ER X 10 HOURS (THESE SYMPTOMS DEVELOPED SINCE FIRST ER VISIT  EARLIER TODAY) - FEVER AND INCREASED WBC X APPROX 18 HOURS - RLQ ABD PAIN AND ELEVATED WBC PMH: SPLENIC CYST SEEN ON PRIOR EXAM IV CONTRAST: 100CC ISOVUE 370 ORAL CONTRAST: NONE RECTAL CONTRAST: NONE AXIAL IMAGING @ 5MM INTERVALS W/MULTIPLANAR RECONSTRUCTION OF IMAGES TOTAL IMAGE NUMBER: 311 CTDIvol(mGy): HEAD:  BODY: 10.6 DLP(mGycm): HEAD:  BODY: 554.7 # CT'S LAST 12 MONTHS: 2 TECH: AJR PT TRANSPORTED W/O INCIDENT COMPARISON:  Earlier same date PERTINENT PAST RADIOLOGIC HISTORY FOR RADIATION EXPOSURE:  2 CT scan(s) and Cardiac perfusion scan(s) on record for the last 12 months FINDINGS: Liver:  No clinically significant abnormalities noted. Gallbladder/Biliary System:  No clinically significant abnormalities noted. Spleen:  A 10 cm splenic calcification with thin peripheral calcified rim. Adrenal glands:  No clinically significant abnormalities noted. Pancreas:  No clinically significant abnormalities noted. Kidneys/Urinary Tract:  No clinically significant abnormalities noted. Urinary bladder:  No clinically significant abnormalities noted. Prostate gland/uterus and ovaries:  No clinically significant abnormalities noted. GI tract:  Fluid is present in nondilated loops of small bowel and there are small calcific densities within bowel in the cecal region, the presence of which makes it difficult to exclude the possibility of appendicoliths and dilated fluid-filled appendix.  Recommend clinical correlation. Vascular structures:  No clinically significant abnormalities noted. Musculoskeletal structures:  No clinically significant abnormalities noted. Miscellaneous:  No clinically significant abnormalities noted.     1. The patient demonstrates multiple loops of nondilated fluid-filled small bowel and at the junction of the large and small bowel several intraluminal small calcific densities are present which makes it difficult to exclude the possibility of appendicoliths and dilated fluid-filled appendix.  Clinical  correlation is recommended to exclude appendicitis.  The results were communicated to the emergency room physician Dr. Young who feels that the patient has symptoms of appendicitis. 2.  A 10 cm cysts with thin peripheral calcification involving the spleen. n/a CATEGORY: n/a The following dose reduction techniques are used for all CT at [facility]: 1.   Automated exposure control. 2.   Adjustment of the mA and/or kV according to patient size. 3.   Use of iterative reconstruction technique. Electronically signed by: Dung Dumont Date:    06/28/2023 Time:    22:07    CT Abdomen Pelvis  Without Contrast    Result Date: 6/28/2023  EXAMINATION: CT ABDOMEN PELVIS WITHOUT CONTRAST CLINICAL HISTORY AND TECHNIQUE: Aylin Curry, RT on 6/28/2023 12:37 PM PT STATUS: ER PROCEDURE: CT ABD/PELVIS WO CLINICAL HX : RLQ ABD. PAIN ONSET THIS AM PMH: N/A IV CONTRAST: NONE ORAL CONTRAST: NONE RECTAL CONTRAST: NONE AXIAL IMAGES @ 5MM INTERVALS WITH MULTIPLANAR RECONSTRUCTION TOTAL IMAGE NUMBER: 158 NUMBER OF CT SCANS IN PAST 12 MONTHS: 1 CTDIvol(mGy): HEAD:     BODY: 5.3 DLP(mGycm): HEAD:     BODY: 309.2 TECH: AKG/KDF PT TRANSPORTED W/O INCIDENT This patient has had 1 CT and nuclear medicine scans performed within the last 12 months. The following DOSE REDUCTION TECHNIQUES are used for all CT scans at [facility]: 1. Automated exposure control. 2. Adjustment of the mA and/or kv according to patient size. 3. Use of iterative reconstruction technique. COMPARISON: None FINDINGS: Liver: No clinically significant abnormalities are noted. Gallbladder/biliary system: No clinically significant abnormalities are noted. Spleen: A large (at least 10-11 cm in greatest diameter) cyst with thin peripheral calcification is noted within the spleen. Adrenal glands: No clinically significant abnormalities are noted. Pancreas: No clinically significant abnormalities are noted. Kidneys/ureters: No clinically  significant abnormalities are noted. Urinary bladder: The urinary bladder is poorly distended with no gross abnormalities noted on limited imaging. Prostate gland and seminal vesicles: No clinically significant abnormalities are noted. GI tract: Unopacified loops of large and small bowel as well as the gastric lumen and appendix are difficult to evaluate with no definite abnormalities appreciated. Vascular structures: Minimal atherosclerotic plaquing is noted within the abdominal aorta. Musculoskeletal structures: A mild-to-moderate, levoconvex, scoliotic curvature of the lumbar spine is present with minimal degenerative changes noted involving the lumbar spine. Miscellaneous: N/A     1. A large (at least 10-11 cm in greatest diameter) cyst with thin peripheral calcification is noted within the spleen.  As described above.  See above comments. 2. Low-grade chronic changes are present Electronically signed by: Yg Bowen Date:    06/28/2023 Time:    12:56

## 2023-07-10 ENCOUNTER — OFFICE VISIT (OUTPATIENT)
Dept: FAMILY MEDICINE | Facility: CLINIC | Age: 38
End: 2023-07-10

## 2023-07-10 VITALS
SYSTOLIC BLOOD PRESSURE: 123 MMHG | HEART RATE: 87 BPM | DIASTOLIC BLOOD PRESSURE: 76 MMHG | RESPIRATION RATE: 20 BRPM | BODY MASS INDEX: 23.64 KG/M2 | TEMPERATURE: 97 F | OXYGEN SATURATION: 98 % | HEIGHT: 68 IN | WEIGHT: 156 LBS

## 2023-07-10 DIAGNOSIS — Z90.49 S/P APPENDECTOMY: Primary | ICD-10-CM

## 2023-07-10 PROCEDURE — 99202 PR OFFICE/OUTPT VISIT, NEW, LEVL II, 15-29 MIN: ICD-10-PCS | Mod: ,,, | Performed by: NURSE PRACTITIONER

## 2023-07-10 PROCEDURE — 99202 OFFICE O/P NEW SF 15 MIN: CPT | Mod: ,,, | Performed by: NURSE PRACTITIONER

## 2023-07-10 NOTE — LETTER
July 10, 2023      [facility]  [address]  [place] [address]  Phone: [phone]  Fax: [phone]       Patient: Anthony Ibarra   YOB: 1985  Date of Visit: 07/10/2023    To Whom It May Concern:    Shmuel Ibarra  was at [facility] on 07/10/2023. The patient may return to work 07/10/2023 with no heavy lifting for 2 weeks restrictions. If you have any questions or concerns, or if I can be of further assistance, please do not hesitate to contact me.    Sincerely,    Yina Oliva LPN

## 2023-07-10 NOTE — PROGRESS NOTES
SUBJECTIVE:  Anthony Ibarra is a 37 y.o. male here for Establish Care (Pt here to establish care - he had his appendix taken out on June 29th - states he is still little sore but feels like it is healing well and is doing okay. Pt takes no meds and has not been to a primary dr in quite some time. )      HPI  Presents to the clinic in follow-up.  S/p appendectomy on 6/28/23.  Has been to Dr Covarrubias for his post-op visit.  Denies any problems.  Eating and drinking well.  No n/v/d.  Arely allergies, medications, history, and problem list were updated as appropriate.    Review of Systems   A comprehensive review of symptoms was completed and negative except as noted above.    Recent Results (from the past 504 hour(s))   Urinalysis    Collection Time: 06/28/23 12:21 PM   Result Value Ref Range    Color, UA Yellow Yellow, Light-Yellow, Dark Yellow, Kayleen, Straw    Appearance, UA Clear Clear    Specific Gravity, UA 1.020     pH, UA 6.5 5.0 - 8.5    Protein, UA Negative Negative mg/dL    Glucose, UA Negative Negative, Normal mg/dL    Ketones, UA Negative Negative mg/dL    Blood, UA Negative Negative unit/L    Bilirubin, UA Negative Negative mg/dL    Urobilinogen, UA 0.2 0.2, 1.0, Normal mg/dL    Nitrites, UA Negative Negative    Leukocyte Esterase, UA Negative Negative unit/L   Comprehensive Metabolic Panel    Collection Time: 06/28/23 12:25 PM   Result Value Ref Range    Sodium Level 139 135 - 145 mmol/L    Potassium Level 4.2 3.5 - 5.1 mmol/L    Chloride 104 98 - 110 mmol/L    Carbon Dioxide 28 21 - 32 mmol/L    Glucose Level 103 70 - 115 mg/dL    Blood Urea Nitrogen 10.0 7.0 - 20.0 mg/dL    Creatinine 0.67 0.66 - 1.25 mg/dL    Calcium Level Total 9.2 8.4 - 10.2 mg/dL    Protein Total 7.6 6.3 - 8.2 gm/dL    Albumin Level 4.7 3.4 - 5.0 g/dL    Globulin 2.9 2.0 - 3.9 gm/dL    Albumin/Globulin Ratio 1.6 ratio    Bilirubin Total 0.5 0.0 - 1.0 mg/dL    Alkaline Phosphatase 81 50 - 144 unit/L    Alanine Aminotransferase 33 1  - 45 unit/L    Aspartate Aminotransferase 29 17 - 59 unit/L    eGFR >90 mls/min/1.73/m2    Anion Gap 7.0 2.0 - 13.0 mEq/L    BUN/Creatinine Ratio 15 12 - 20   CBC with Differential    Collection Time: 06/28/23 12:25 PM   Result Value Ref Range    WBC 13.73 (H) 4.00 - 11.50 x10(3)/mcL    RBC 5.67 4.00 - 6.00 x10(6)/mcL    Hgb 17.1 13.0 - 18.0 g/dL    Hct 49.6 36.0 - 52.0 %    MCV 87.5 79.0 - 99.0 fL    MCH 30.2 27.0 - 34.0 pg    MCHC 34.5 31.0 - 37.0 g/dL    RDW 12.7 %    Platelet 231 140 - 371 x10(3)/mcL    MPV 9.1 (L) 9.4 - 12.4 fL    Neut % 81.4 (H) 37 - 73 %    Lymph % 12.8 (L) 20 - 55 %    Mono % 5.2 4.7 - 12.5 %    Eos % 0.1 (L) 0.7 - 7 %    Basophil % 0.3 0.1 - 1.2 %    Lymph # 1.76 1.32 - 3.57 x10(3)/mcL    Neut # 11.16 (H) 1.78 - 5.38 x10(3)/mcL    Mono # 0.72 0.3 - 0.82 x10(3)/mcL    Eos # 0.02 (L) 0.04 - 0.54 x10(3)/mcL    Baso # 0.04 0.01 - 0.08 x10(3)/mcL    IG# 0.03 0.0001 - 0.031 x10(3)/mcL    IG% 0.2 0 - 0.5 %    NRBC% 0.0 <=1 %   Comprehensive metabolic panel    Collection Time: 06/28/23  9:03 PM   Result Value Ref Range    Sodium Level 136 135 - 145 mmol/L    Potassium Level 3.6 3.5 - 5.1 mmol/L    Chloride 106 98 - 110 mmol/L    Carbon Dioxide 22 21 - 32 mmol/L    Glucose Level 124 (H) 70 - 115 mg/dL    Blood Urea Nitrogen 11.0 7.0 - 20.0 mg/dL    Creatinine 0.77 0.66 - 1.25 mg/dL    Calcium Level Total 9.0 8.4 - 10.2 mg/dL    Protein Total 7.5 6.3 - 8.2 gm/dL    Albumin Level 4.6 3.4 - 5.0 g/dL    Globulin 2.9 2.0 - 3.9 gm/dL    Albumin/Globulin Ratio 1.6 ratio    Bilirubin Total 0.7 0.0 - 1.0 mg/dL    Alkaline Phosphatase 73 50 - 144 unit/L    Alanine Aminotransferase 34 1 - 45 unit/L    Aspartate Aminotransferase 27 17 - 59 unit/L    eGFR >90 mls/min/1.73/m2    Anion Gap 8.0 2.0 - 13.0 mEq/L    BUN/Creatinine Ratio 14 12 - 20   Lipase    Collection Time: 06/28/23  9:03 PM   Result Value Ref Range    Lipase Level 31 23 - 300 U/L   CBC with Differential    Collection Time: 06/28/23  9:03 PM    Result Value Ref Range    WBC 14.58 (H) 4.00 - 11.50 x10(3)/mcL    RBC 5.52 4.00 - 6.00 x10(6)/mcL    Hgb 16.7 13.0 - 18.0 g/dL    Hct 47.9 36.0 - 52.0 %    MCV 86.8 79.0 - 99.0 fL    MCH 30.3 27.0 - 34.0 pg    MCHC 34.9 31.0 - 37.0 g/dL    RDW 12.8 %    Platelet 236 140 - 371 x10(3)/mcL    MPV 9.2 (L) 9.4 - 12.4 fL    Neut % 81.3 (H) 37 - 73 %    Lymph % 10.5 (L) 20 - 55 %    Mono % 7.6 4.7 - 12.5 %    Eos % 0.1 (L) 0.7 - 7 %    Basophil % 0.2 0.1 - 1.2 %    Lymph # 1.53 1.32 - 3.57 x10(3)/mcL    Neut # 11.84 (H) 1.78 - 5.38 x10(3)/mcL    Mono # 1.11 (H) 0.3 - 0.82 x10(3)/mcL    Eos # 0.02 (L) 0.04 - 0.54 x10(3)/mcL    Baso # 0.03 0.01 - 0.08 x10(3)/mcL    IG# 0.05 (H) 0.0001 - 0.031 x10(3)/mcL    IG% 0.3 0 - 0.5 %    NRBC% 0.0 <=1 %   Urinalysis, Reflex to Urine Culture    Collection Time: 06/28/23 10:02 PM    Specimen: Urine   Result Value Ref Range    Color, UA Yellow Yellow, Light-Yellow, Dark Yellow, Kayleen, Straw    Appearance, UA Clear Clear    Specific Gravity, UA <=1.005     pH, UA 7.0 5.0 - 8.5    Protein, UA Negative Negative mg/dL    Glucose, UA Negative Negative, Normal mg/dL    Ketones, UA Negative Negative mg/dL    Blood, UA Moderate (A) Negative unit/L    Bilirubin, UA Negative Negative mg/dL    Urobilinogen, UA 0.2 0.2, 1.0, Normal mg/dL    Nitrites, UA Negative Negative    Leukocyte Esterase, UA Negative Negative unit/L   Urinalysis, Microscopic    Collection Time: 06/28/23 10:02 PM   Result Value Ref Range    Bacteria, UA Rare None Seen, Rare, Occasional /HPF    RBC, UA 6-10 (A) None Seen, 0-2, 3-5, 0-5 /HPF    WBC, UA 0-5 None Seen, 0-2, 3-5, 0-5 /HPF    Squamous Epithelial Cells, UA Rare None Seen, Rare, Occasional, Occ /HPF   Basic metabolic panel    Collection Time: 06/29/23  4:24 AM   Result Value Ref Range    Sodium Level 138 135 - 145 mmol/L    Potassium Level 4.3 3.5 - 5.1 mmol/L    Chloride 106 98 - 110 mmol/L    Carbon Dioxide 26 21 - 32 mmol/L    Glucose Level 91 70 - 115 mg/dL     Blood Urea Nitrogen 9.0 7.0 - 20.0 mg/dL    Creatinine 0.72 0.66 - 1.25 mg/dL    BUN/Creatinine Ratio 13 12 - 20    Calcium Level Total 8.7 8.4 - 10.2 mg/dL    Anion Gap 6.0 2.0 - 13.0 mEq/L    eGFR >90 mls/min/1.73/m2   CBC with Differential    Collection Time: 06/29/23  4:24 AM   Result Value Ref Range    WBC 14.17 (H) 4.00 - 11.50 x10(3)/mcL    RBC 5.24 4.00 - 6.00 x10(6)/mcL    Hgb 15.7 13.0 - 18.0 g/dL    Hct 46.1 36.0 - 52.0 %    MCV 88.0 79.0 - 99.0 fL    MCH 30.0 27.0 - 34.0 pg    MCHC 34.1 31.0 - 37.0 g/dL    RDW 13.0 %    Platelet 211 140 - 371 x10(3)/mcL    MPV 9.5 9.4 - 12.4 fL    Neut % 73.8 (H) 37 - 73 %    Lymph % 17.9 (L) 20 - 55 %    Mono % 7.5 4.7 - 12.5 %    Eos % 0.2 (L) 0.7 - 7 %    Basophil % 0.2 0.1 - 1.2 %    Lymph # 2.53 1.32 - 3.57 x10(3)/mcL    Neut # 10.47 (H) 1.78 - 5.38 x10(3)/mcL    Mono # 1.06 (H) 0.3 - 0.82 x10(3)/mcL    Eos # 0.03 (L) 0.04 - 0.54 x10(3)/mcL    Baso # 0.03 0.01 - 0.08 x10(3)/mcL    IG# 0.05 (H) 0.0001 - 0.031 x10(3)/mcL    IG% 0.4 0 - 0.5 %    NRBC% 0.0 <=1 %   Basic Metabolic Panel    Collection Time: 06/30/23  5:04 AM   Result Value Ref Range    Sodium Level 134 (L) 135 - 145 mmol/L    Potassium Level 3.7 3.5 - 5.1 mmol/L    Chloride 104 98 - 110 mmol/L    Carbon Dioxide 21 21 - 32 mmol/L    Glucose Level 94 70 - 115 mg/dL    Blood Urea Nitrogen 7.0 7.0 - 20.0 mg/dL    Creatinine 0.77 0.66 - 1.25 mg/dL    BUN/Creatinine Ratio 9 (L) 12 - 20    Calcium Level Total 8.3 (L) 8.4 - 10.2 mg/dL    Anion Gap 9.0 2.0 - 13.0 mEq/L    eGFR >90 mls/min/1.73/m2   CBC with Differential    Collection Time: 06/30/23  5:04 AM   Result Value Ref Range    WBC 9.93 4.00 - 11.50 x10(3)/mcL    RBC 4.99 4.00 - 6.00 x10(6)/mcL    Hgb 15.0 13.0 - 18.0 g/dL    Hct 43.7 36.0 - 52.0 %    MCV 87.6 79.0 - 99.0 fL    MCH 30.1 27.0 - 34.0 pg    MCHC 34.3 31.0 - 37.0 g/dL    RDW 12.8 %    Platelet 187 140 - 371 x10(3)/mcL    MPV 9.2 (L) 9.4 - 12.4 fL    Neut % 81.0 (H) 37 - 73 %    Lymph % 11.1 (L)  "20 - 55 %    Mono % 7.4 4.7 - 12.5 %    Eos % 0.1 (L) 0.7 - 7 %    Basophil % 0.2 0.1 - 1.2 %    Lymph # 1.10 (L) 1.32 - 3.57 x10(3)/mcL    Neut # 8.05 (H) 1.78 - 5.38 x10(3)/mcL    Mono # 0.73 0.3 - 0.82 x10(3)/mcL    Eos # 0.01 (L) 0.04 - 0.54 x10(3)/mcL    Baso # 0.02 0.01 - 0.08 x10(3)/mcL    IG# 0.02 0.0001 - 0.031 x10(3)/mcL    IG% 0.2 0 - 0.5 %    NRBC% 0.0 <=1 %       OBJECTIVE:  Vital signs  Vitals:    07/10/23 1041   BP: 123/76   BP Location: Right arm   Patient Position: Sitting   Pulse: 87   Resp: 20   Temp: 97.2 °F (36.2 °C)   SpO2: 98%   Weight: 70.8 kg (156 lb)   Height: 5' 8" (1.727 m)        Physical Exam  Constitutional:       Appearance: Normal appearance.   HENT:      Head: Normocephalic and atraumatic.      Right Ear: Tympanic membrane, ear canal and external ear normal.      Left Ear: Tympanic membrane, ear canal and external ear normal.      Nose: Nose normal.      Mouth/Throat:      Mouth: Mucous membranes are moist.      Pharynx: Oropharynx is clear.   Eyes:      Conjunctiva/sclera: Conjunctivae normal.   Cardiovascular:      Rate and Rhythm: Normal rate and regular rhythm.   Pulmonary:      Effort: Pulmonary effort is normal.      Breath sounds: Normal breath sounds.   Abdominal:      General: Bowel sounds are normal.      Palpations: Abdomen is soft.      Comments: Lap sites healing well without redness or swelling.   Musculoskeletal:         General: Normal range of motion.      Cervical back: Normal range of motion and neck supple.   Skin:     General: Skin is warm.      Capillary Refill: Capillary refill takes less than 2 seconds.   Neurological:      Mental Status: He is alert.   Psychiatric:         Mood and Affect: Mood normal.         Behavior: Behavior normal.         Judgment: Judgment normal.        ASSESSMENT/PLAN:  1. S/P appendectomy  Comments:  cleared for light duty with no heavy lifting, follow-up in 2 weeks        Follow Up:  Follow up in about 2 weeks (around " 7/24/2023).             none

## 2023-07-26 ENCOUNTER — OFFICE VISIT (OUTPATIENT)
Dept: FAMILY MEDICINE | Facility: CLINIC | Age: 38
End: 2023-07-26

## 2023-07-26 VITALS
WEIGHT: 153 LBS | OXYGEN SATURATION: 97 % | HEIGHT: 68 IN | DIASTOLIC BLOOD PRESSURE: 72 MMHG | BODY MASS INDEX: 23.19 KG/M2 | TEMPERATURE: 98 F | SYSTOLIC BLOOD PRESSURE: 114 MMHG | RESPIRATION RATE: 18 BRPM | HEART RATE: 85 BPM

## 2023-07-26 DIAGNOSIS — Z90.49 S/P APPENDECTOMY: Primary | ICD-10-CM

## 2023-07-26 PROCEDURE — 99212 OFFICE O/P EST SF 10 MIN: CPT | Mod: ,,, | Performed by: NURSE PRACTITIONER

## 2023-07-26 PROCEDURE — 99212 PR OFFICE/OUTPT VISIT, EST, LEVL II, 10-19 MIN: ICD-10-PCS | Mod: ,,, | Performed by: NURSE PRACTITIONER

## 2023-07-26 NOTE — PROGRESS NOTES
"SUBJECTIVE:  Anthony Ibarra is a 38 y.o. male here for 2 week f/u      HPI  Presents to the clinic in follow-up.  Is 1 month post appendectomy.  Has been working restricted, but feels ready for full duty.    Arely allergies, medications, history, and problem list were updated as appropriate.    Review of Systems   A comprehensive review of symptoms was completed and negative except as noted above.    No results found for this or any previous visit (from the past 504 hour(s)).    OBJECTIVE:  Vital signs  Vitals:    07/26/23 1351   BP: 114/72   BP Location: Right arm   Patient Position: Sitting   Pulse: 85   Resp: 18   Temp: 98 °F (36.7 °C)   TempSrc: Temporal   SpO2: 97%   Weight: 69.4 kg (153 lb)   Height: 5' 8" (1.727 m)        Physical Exam  Constitutional:       Appearance: Normal appearance.   HENT:      Head: Normocephalic and atraumatic.      Nose: Nose normal.      Mouth/Throat:      Mouth: Mucous membranes are moist.      Pharynx: Oropharynx is clear.   Eyes:      Conjunctiva/sclera: Conjunctivae normal.   Cardiovascular:      Rate and Rhythm: Normal rate and regular rhythm.   Pulmonary:      Effort: Pulmonary effort is normal.      Breath sounds: Normal breath sounds.   Abdominal:      General: Bowel sounds are normal.      Palpations: Abdomen is soft.      Comments: Well healed lap sites without redness or swelling.    Musculoskeletal:         General: Normal range of motion.      Cervical back: Normal range of motion and neck supple.   Skin:     General: Skin is warm.      Capillary Refill: Capillary refill takes less than 2 seconds.   Neurological:      General: No focal deficit present.      Mental Status: He is alert and oriented to person, place, and time.   Psychiatric:         Mood and Affect: Mood normal.         Behavior: Behavior normal.         Judgment: Judgment normal.        ASSESSMENT/PLAN:  1. S/P appendectomy  Overview:  Cleared for full duty - no restrictions.          Follow " Up:  Follow up if symptoms worsen or fail to improve.

## 2023-07-26 NOTE — LETTER
July 26, 2023      [facility]  [address]  [place] [address]  Phone: [phone]  Fax: [phone]       Patient: Anthony Ibarra   YOB: 1985  Date of Visit: 07/26/2023    To Whom It May Concern:    Shmuel Ibarra  was at [facility] on 07/26/2023. The patient may return to work/school on 07/27/2023 with no restrictions. If you have any questions or concerns, or if I can be of further assistance, please do not hesitate to contact me.    Sincerely,    Amaris Jaquez LPN

## (undated) DEVICE — BLADE SURG CARBON STEEL SZ11

## (undated) DEVICE — SLEEVE KII ADV FIX 5X100MM

## (undated) DEVICE — TRAY CATH FOL SIL DRN BAG 16FR

## (undated) DEVICE — BLANKET SNUGGLE WARM ADLT SM

## (undated) DEVICE — DRAPE DEVON INSTRUMENT 10X16IN

## (undated) DEVICE — SCISSOR 5MMX35CM DIRECT DRIVE

## (undated) DEVICE — SHEAR HARMONIC ACE+ 5MM 36CM

## (undated) DEVICE — DISSECTOR EPIX LAPA 5MMX35CM

## (undated) DEVICE — HOLDER SCALPEL SURGICAL GOLD

## (undated) DEVICE — MEDIPORE+PAD

## (undated) DEVICE — RELOAD ECHELON ENDOPATH 45MM

## (undated) DEVICE — NDL HYPO POLYPR STD 26G 1.5IN

## (undated) DEVICE — SYR DISP LL 5CC

## (undated) DEVICE — SOL 9P NACL IRR PIC IL

## (undated) DEVICE — STAPLER ECHELON FLEX GST 45MM

## (undated) DEVICE — BAG TISSUE RETRIEVAL 5MM

## (undated) DEVICE — TROCAR KII FIOS ZTHREAD 12X100

## (undated) DEVICE — NDL PNEUMO INSUFFLATI 120MM

## (undated) DEVICE — IRRIGATOR HYDRO-SURG PLUS 5MM

## (undated) DEVICE — Device

## (undated) DEVICE — KIT ANTIFOG W/SPONG & FLUID

## (undated) DEVICE — SUT 0 VICRYL / UR6 (J603)

## (undated) DEVICE — CARTRIDGE BABCOCK GRASPER 5X38

## (undated) DEVICE — APPLIER CLIP EPIX UNIV 5X34

## (undated) DEVICE — GLOVE PROTEXIS HYDROGEL SZ7.5

## (undated) DEVICE — SUT MONOCRYL 4-0 PS-2

## (undated) DEVICE — TROCAR ENDO Z THREAD KII 5X100

## (undated) DEVICE — NDL GRANEE OPEN LOOP GRASPER